# Patient Record
Sex: MALE | Race: BLACK OR AFRICAN AMERICAN | NOT HISPANIC OR LATINO | Employment: FULL TIME | URBAN - METROPOLITAN AREA
[De-identification: names, ages, dates, MRNs, and addresses within clinical notes are randomized per-mention and may not be internally consistent; named-entity substitution may affect disease eponyms.]

---

## 2023-11-29 ENCOUNTER — APPOINTMENT (OUTPATIENT)
Dept: GENERAL RADIOLOGY | Facility: HOSPITAL | Age: 43
End: 2023-11-29
Payer: COMMERCIAL

## 2023-11-29 ENCOUNTER — APPOINTMENT (OUTPATIENT)
Dept: CT IMAGING | Facility: HOSPITAL | Age: 43
End: 2023-11-29
Payer: COMMERCIAL

## 2023-11-29 ENCOUNTER — APPOINTMENT (OUTPATIENT)
Dept: MRI IMAGING | Facility: HOSPITAL | Age: 43
End: 2023-11-29
Payer: COMMERCIAL

## 2023-11-29 ENCOUNTER — HOSPITAL ENCOUNTER (OUTPATIENT)
Facility: HOSPITAL | Age: 43
Setting detail: OBSERVATION
Discharge: HOME | End: 2023-11-30
Attending: EMERGENCY MEDICINE | Admitting: INTERNAL MEDICINE
Payer: COMMERCIAL

## 2023-11-29 ENCOUNTER — APPOINTMENT (OUTPATIENT)
Dept: CARDIOLOGY | Facility: HOSPITAL | Age: 43
End: 2023-11-29
Payer: COMMERCIAL

## 2023-11-29 DIAGNOSIS — R20.2 ARM PARESTHESIA, LEFT: ICD-10-CM

## 2023-11-29 DIAGNOSIS — I63.9 CEREBROVASCULAR ACCIDENT (CVA), UNSPECIFIED MECHANISM: Primary | ICD-10-CM

## 2023-11-29 DIAGNOSIS — R41.841 COGNITIVE COMMUNICATION DEFICIT: ICD-10-CM

## 2023-11-29 PROBLEM — Z86.73 HISTORY OF STROKE: Status: ACTIVE | Noted: 2023-11-29

## 2023-11-29 PROBLEM — Z78.9 ALCOHOL USE: Status: ACTIVE | Noted: 2023-11-29

## 2023-11-29 PROBLEM — F14.90 COCAINE USE: Status: ACTIVE | Noted: 2023-11-29

## 2023-11-29 PROBLEM — R20.0 ARM NUMBNESS LEFT: Status: ACTIVE | Noted: 2023-11-29

## 2023-11-29 LAB
ALBUMIN SERPL-MCNC: 4 G/DL (ref 3.5–5.2)
ALBUMIN/GLOB SERPL: 1.2 G/DL
ALP SERPL-CCNC: 64 U/L (ref 39–117)
ALT SERPL W P-5'-P-CCNC: 13 U/L (ref 1–41)
AMPHET+METHAMPHET UR QL: NEGATIVE
AMPHETAMINES UR QL: NEGATIVE
ANION GAP SERPL CALCULATED.3IONS-SCNC: 8 MMOL/L (ref 5–15)
APTT PPP: 28.7 SECONDS (ref 22–39)
AST SERPL-CCNC: 27 U/L (ref 1–40)
BARBITURATES UR QL SCN: NEGATIVE
BASOPHILS # BLD MANUAL: 0.09 10*3/MM3 (ref 0–0.2)
BASOPHILS NFR BLD MANUAL: 1 % (ref 0–1.5)
BENZODIAZ UR QL SCN: NEGATIVE
BH CV ECHO MEAS - AO MAX PG: 5.8 MMHG
BH CV ECHO MEAS - AO MEAN PG: 3 MMHG
BH CV ECHO MEAS - AO ROOT DIAM: 3 CM
BH CV ECHO MEAS - AO V2 MAX: 120 CM/SEC
BH CV ECHO MEAS - AO V2 VTI: 20.1 CM
BH CV ECHO MEAS - AVA(I,D): 2.9 CM2
BH CV ECHO MEAS - EDV(CUBED): 227 ML
BH CV ECHO MEAS - EDV(MOD-SP2): 86.7 ML
BH CV ECHO MEAS - EDV(MOD-SP4): 86.3 ML
BH CV ECHO MEAS - EF(MOD-BP): 63.2 %
BH CV ECHO MEAS - EF(MOD-SP2): 63.3 %
BH CV ECHO MEAS - EF(MOD-SP4): 62 %
BH CV ECHO MEAS - ESV(CUBED): 68.9 ML
BH CV ECHO MEAS - ESV(MOD-SP2): 31.8 ML
BH CV ECHO MEAS - ESV(MOD-SP4): 32.8 ML
BH CV ECHO MEAS - FS: 32.8 %
BH CV ECHO MEAS - IVS/LVPW: 1.33 CM
BH CV ECHO MEAS - IVSD: 0.8 CM
BH CV ECHO MEAS - LA DIMENSION: 3.3 CM
BH CV ECHO MEAS - LAT PEAK E' VEL: 9.9 CM/SEC
BH CV ECHO MEAS - LV DIASTOLIC VOL/BSA (35-75): 44.7 CM2
BH CV ECHO MEAS - LV MASS(C)D: 162.8 GRAMS
BH CV ECHO MEAS - LV MAX PG: 3.9 MMHG
BH CV ECHO MEAS - LV MEAN PG: 2 MMHG
BH CV ECHO MEAS - LV SYSTOLIC VOL/BSA (12-30): 17 CM2
BH CV ECHO MEAS - LV V1 MAX: 99 CM/SEC
BH CV ECHO MEAS - LV V1 VTI: 18.5 CM
BH CV ECHO MEAS - LVIDD: 6.1 CM
BH CV ECHO MEAS - LVIDS: 4.1 CM
BH CV ECHO MEAS - LVOT AREA: 3.1 CM2
BH CV ECHO MEAS - LVOT DIAM: 2 CM
BH CV ECHO MEAS - LVPWD: 0.6 CM
BH CV ECHO MEAS - MED PEAK E' VEL: 10.6 CM/SEC
BH CV ECHO MEAS - MV A MAX VEL: 55 CM/SEC
BH CV ECHO MEAS - MV DEC SLOPE: 335 CM/SEC2
BH CV ECHO MEAS - MV DEC TIME: 0.24 SEC
BH CV ECHO MEAS - MV E MAX VEL: 70.2 CM/SEC
BH CV ECHO MEAS - MV E/A: 1.28
BH CV ECHO MEAS - MV MAX PG: 3.1 MMHG
BH CV ECHO MEAS - MV MEAN PG: 1 MMHG
BH CV ECHO MEAS - MV P1/2T: 77.1 MSEC
BH CV ECHO MEAS - MV V2 VTI: 22.7 CM
BH CV ECHO MEAS - MVA(P1/2T): 2.9 CM2
BH CV ECHO MEAS - MVA(VTI): 2.6 CM2
BH CV ECHO MEAS - PA ACC TIME: 0.16 SEC
BH CV ECHO MEAS - SI(MOD-SP2): 28.4 ML/M2
BH CV ECHO MEAS - SI(MOD-SP4): 27.7 ML/M2
BH CV ECHO MEAS - SV(LVOT): 58.1 ML
BH CV ECHO MEAS - SV(MOD-SP2): 54.9 ML
BH CV ECHO MEAS - SV(MOD-SP4): 53.5 ML
BH CV ECHO MEAS - TAPSE (>1.6): 1.78 CM
BH CV ECHO MEASUREMENTS AVERAGE E/E' RATIO: 6.85
BH CV ECHO SHUNT ASSESSMENT PERFORMED (HIDDEN SCRIPTING): 1
BH CV VAS BP LEFT ARM: NORMAL MMHG
BH CV XLRA - RV BASE: 3.3 CM
BH CV XLRA - RV LENGTH: 7.2 CM
BH CV XLRA - RV MID: 2.3 CM
BH CV XLRA - TDI S': 10.9 CM/SEC
BILIRUB SERPL-MCNC: 0.3 MG/DL (ref 0–1.2)
BUN SERPL-MCNC: 7 MG/DL (ref 6–20)
BUN/CREAT SERPL: 9.5 (ref 7–25)
BUPRENORPHINE SERPL-MCNC: NEGATIVE NG/ML
CALCIUM SPEC-SCNC: 8.6 MG/DL (ref 8.6–10.5)
CANNABINOIDS SERPL QL: NEGATIVE
CHLORIDE SERPL-SCNC: 100 MMOL/L (ref 98–107)
CO2 SERPL-SCNC: 28 MMOL/L (ref 22–29)
COCAINE UR QL: POSITIVE
CREAT SERPL-MCNC: 0.74 MG/DL (ref 0.76–1.27)
DEPRECATED RDW RBC AUTO: 45.7 FL (ref 37–54)
EGFRCR SERPLBLD CKD-EPI 2021: 116.7 ML/MIN/1.73
EOSINOPHIL # BLD MANUAL: 3.48 10*3/MM3 (ref 0–0.4)
EOSINOPHIL NFR BLD MANUAL: 37 % (ref 0.3–6.2)
ERYTHROCYTE [DISTWIDTH] IN BLOOD BY AUTOMATED COUNT: 13.3 % (ref 12.3–15.4)
ETHANOL BLD-MCNC: <10 MG/DL (ref 0–10)
FENTANYL UR-MCNC: NEGATIVE NG/ML
GLOBULIN UR ELPH-MCNC: 3.4 GM/DL
GLUCOSE BLDC GLUCOMTR-MCNC: 130 MG/DL (ref 70–130)
GLUCOSE BLDC GLUCOMTR-MCNC: 75 MG/DL (ref 70–130)
GLUCOSE SERPL-MCNC: 100 MG/DL (ref 65–99)
HCT VFR BLD AUTO: 42 % (ref 37.5–51)
HGB BLD-MCNC: 14.2 G/DL (ref 13–17.7)
HOLD SPECIMEN: NORMAL
IVRT: 88 MS
LEFT ATRIUM VOLUME INDEX: 23.2 ML/M2
LEFT ATRIUM VOLUME: 44.8 ML
LYMPHOCYTES # BLD MANUAL: 2.73 10*3/MM3 (ref 0.7–3.1)
LYMPHOCYTES NFR BLD MANUAL: 2 % (ref 5–12)
MCH RBC QN AUTO: 31.8 PG (ref 26.6–33)
MCHC RBC AUTO-ENTMCNC: 33.8 G/DL (ref 31.5–35.7)
MCV RBC AUTO: 94.2 FL (ref 79–97)
METHADONE UR QL SCN: NEGATIVE
MONOCYTES # BLD: 0.19 10*3/MM3 (ref 0.1–0.9)
NEUTROPHILS # BLD AUTO: 2.92 10*3/MM3 (ref 1.7–7)
NEUTROPHILS NFR BLD MANUAL: 30 % (ref 42.7–76)
NEUTS BAND NFR BLD MANUAL: 1 % (ref 0–5)
NRBC SPEC MANUAL: 0 /100 WBC (ref 0–0.2)
OPIATES UR QL: NEGATIVE
OXYCODONE UR QL SCN: NEGATIVE
PCP UR QL SCN: NEGATIVE
PLAT MORPH BLD: NORMAL
PLATELET # BLD AUTO: 387 10*3/MM3 (ref 140–450)
PMV BLD AUTO: 9.1 FL (ref 6–12)
POTASSIUM SERPL-SCNC: 3.4 MMOL/L (ref 3.5–5.2)
POTASSIUM SERPL-SCNC: 3.7 MMOL/L (ref 3.5–5.2)
PROT SERPL-MCNC: 7.4 G/DL (ref 6–8.5)
QT INTERVAL: 346 MS
QTC INTERVAL: 454 MS
RBC # BLD AUTO: 4.46 10*6/MM3 (ref 4.14–5.8)
RBC MORPH BLD: NORMAL
SODIUM SERPL-SCNC: 136 MMOL/L (ref 136–145)
TRICYCLICS UR QL SCN: NEGATIVE
TROPONIN T SERPL HS-MCNC: 8 NG/L
VARIANT LYMPHS NFR BLD MANUAL: 2 % (ref 0–5)
VARIANT LYMPHS NFR BLD MANUAL: 27 % (ref 19.6–45.3)
WBC MORPH BLD: NORMAL
WBC NRBC COR # BLD AUTO: 9.41 10*3/MM3 (ref 3.4–10.8)
WHOLE BLOOD HOLD COAG: NORMAL
WHOLE BLOOD HOLD SPECIMEN: NORMAL

## 2023-11-29 PROCEDURE — 80307 DRUG TEST PRSMV CHEM ANLYZR: CPT | Performed by: EMERGENCY MEDICINE

## 2023-11-29 PROCEDURE — 25510000001 IOPAMIDOL PER 1 ML: Performed by: EMERGENCY MEDICINE

## 2023-11-29 PROCEDURE — 0042T HC CT CEREBRAL PERFUSION W/WO CONTRAST: CPT

## 2023-11-29 PROCEDURE — 85025 COMPLETE CBC W/AUTO DIFF WBC: CPT | Performed by: EMERGENCY MEDICINE

## 2023-11-29 PROCEDURE — 96361 HYDRATE IV INFUSION ADD-ON: CPT

## 2023-11-29 PROCEDURE — 82948 REAGENT STRIP/BLOOD GLUCOSE: CPT

## 2023-11-29 PROCEDURE — 96365 THER/PROPH/DIAG IV INF INIT: CPT

## 2023-11-29 PROCEDURE — 85007 BL SMEAR W/DIFF WBC COUNT: CPT | Performed by: EMERGENCY MEDICINE

## 2023-11-29 PROCEDURE — 25810000003 SODIUM CHLORIDE 0.9 % SOLUTION: Performed by: FAMILY MEDICINE

## 2023-11-29 PROCEDURE — G0378 HOSPITAL OBSERVATION PER HR: HCPCS

## 2023-11-29 PROCEDURE — 70551 MRI BRAIN STEM W/O DYE: CPT

## 2023-11-29 PROCEDURE — 71045 X-RAY EXAM CHEST 1 VIEW: CPT

## 2023-11-29 PROCEDURE — 82077 ASSAY SPEC XCP UR&BREATH IA: CPT | Performed by: EMERGENCY MEDICINE

## 2023-11-29 PROCEDURE — 93306 TTE W/DOPPLER COMPLETE: CPT

## 2023-11-29 PROCEDURE — 70496 CT ANGIOGRAPHY HEAD: CPT

## 2023-11-29 PROCEDURE — 36415 COLL VENOUS BLD VENIPUNCTURE: CPT

## 2023-11-29 PROCEDURE — 93306 TTE W/DOPPLER COMPLETE: CPT | Performed by: INTERNAL MEDICINE

## 2023-11-29 PROCEDURE — 85730 THROMBOPLASTIN TIME PARTIAL: CPT | Performed by: EMERGENCY MEDICINE

## 2023-11-29 PROCEDURE — 93005 ELECTROCARDIOGRAM TRACING: CPT | Performed by: EMERGENCY MEDICINE

## 2023-11-29 PROCEDURE — 25010000002 THIAMINE PER 100 MG: Performed by: FAMILY MEDICINE

## 2023-11-29 PROCEDURE — 25010000002 MAGNESIUM SULFATE IN D5W 1G/100ML (PREMIX) 1-5 GM/100ML-% SOLUTION: Performed by: NURSE PRACTITIONER

## 2023-11-29 PROCEDURE — 84132 ASSAY OF SERUM POTASSIUM: CPT | Performed by: EMERGENCY MEDICINE

## 2023-11-29 PROCEDURE — 99204 OFFICE O/P NEW MOD 45 MIN: CPT | Performed by: NURSE PRACTITIONER

## 2023-11-29 PROCEDURE — 96375 TX/PRO/DX INJ NEW DRUG ADDON: CPT

## 2023-11-29 PROCEDURE — 93010 ELECTROCARDIOGRAM REPORT: CPT | Performed by: INTERNAL MEDICINE

## 2023-11-29 PROCEDURE — 70498 CT ANGIOGRAPHY NECK: CPT

## 2023-11-29 PROCEDURE — 99285 EMERGENCY DEPT VISIT HI MDM: CPT

## 2023-11-29 PROCEDURE — 70450 CT HEAD/BRAIN W/O DYE: CPT

## 2023-11-29 PROCEDURE — 84484 ASSAY OF TROPONIN QUANT: CPT | Performed by: EMERGENCY MEDICINE

## 2023-11-29 PROCEDURE — 99222 1ST HOSP IP/OBS MODERATE 55: CPT | Performed by: FAMILY MEDICINE

## 2023-11-29 PROCEDURE — 80053 COMPREHEN METABOLIC PANEL: CPT | Performed by: EMERGENCY MEDICINE

## 2023-11-29 RX ORDER — MIDAZOLAM HYDROCHLORIDE 1 MG/ML
2 INJECTION INTRAMUSCULAR; INTRAVENOUS
Status: DISPENSED | OUTPATIENT
Start: 2023-11-29 | End: 2023-11-30

## 2023-11-29 RX ORDER — CLOPIDOGREL BISULFATE 75 MG/1
300 TABLET ORAL ONCE
Status: COMPLETED | OUTPATIENT
Start: 2023-11-29 | End: 2023-11-29

## 2023-11-29 RX ORDER — MIDAZOLAM HYDROCHLORIDE 1 MG/ML
1 INJECTION INTRAMUSCULAR; INTRAVENOUS
Status: DISPENSED | OUTPATIENT
Start: 2023-11-29 | End: 2023-11-30

## 2023-11-29 RX ORDER — SODIUM CHLORIDE 0.9 % (FLUSH) 0.9 %
10 SYRINGE (ML) INJECTION EVERY 12 HOURS SCHEDULED
Status: DISCONTINUED | OUTPATIENT
Start: 2023-11-29 | End: 2023-11-30 | Stop reason: HOSPADM

## 2023-11-29 RX ORDER — SODIUM CHLORIDE 0.9 % (FLUSH) 0.9 %
10 SYRINGE (ML) INJECTION AS NEEDED
Status: DISCONTINUED | OUTPATIENT
Start: 2023-11-29 | End: 2023-11-30 | Stop reason: HOSPADM

## 2023-11-29 RX ORDER — ASPIRIN 81 MG/1
81 TABLET, CHEWABLE ORAL DAILY
Status: DISCONTINUED | OUTPATIENT
Start: 2023-11-30 | End: 2023-11-30 | Stop reason: HOSPADM

## 2023-11-29 RX ORDER — FOLIC ACID 1 MG/1
1 TABLET ORAL DAILY
Status: DISCONTINUED | OUTPATIENT
Start: 2023-11-29 | End: 2023-11-30 | Stop reason: HOSPADM

## 2023-11-29 RX ORDER — AMLODIPINE BESYLATE 10 MG/1
10 TABLET ORAL DAILY
COMMUNITY

## 2023-11-29 RX ORDER — SODIUM CHLORIDE 9 MG/ML
100 INJECTION, SOLUTION INTRAVENOUS CONTINUOUS
Status: ACTIVE | OUTPATIENT
Start: 2023-11-29 | End: 2023-11-30

## 2023-11-29 RX ORDER — POLYETHYLENE GLYCOL 3350 17 G/17G
17 POWDER, FOR SOLUTION ORAL DAILY PRN
Status: DISCONTINUED | OUTPATIENT
Start: 2023-11-29 | End: 2023-11-30 | Stop reason: HOSPADM

## 2023-11-29 RX ORDER — BISACODYL 5 MG/1
5 TABLET, DELAYED RELEASE ORAL DAILY PRN
Status: DISCONTINUED | OUTPATIENT
Start: 2023-11-29 | End: 2023-11-30 | Stop reason: HOSPADM

## 2023-11-29 RX ORDER — ASPIRIN 300 MG/1
300 SUPPOSITORY RECTAL ONCE
Status: DISCONTINUED | OUTPATIENT
Start: 2023-11-29 | End: 2023-11-30

## 2023-11-29 RX ORDER — SODIUM CHLORIDE 9 MG/ML
40 INJECTION, SOLUTION INTRAVENOUS AS NEEDED
Status: DISCONTINUED | OUTPATIENT
Start: 2023-11-29 | End: 2023-11-30 | Stop reason: HOSPADM

## 2023-11-29 RX ORDER — ACETAMINOPHEN 325 MG/1
650 TABLET ORAL EVERY 4 HOURS PRN
Status: DISCONTINUED | OUTPATIENT
Start: 2023-11-29 | End: 2023-11-30 | Stop reason: HOSPADM

## 2023-11-29 RX ORDER — LORAZEPAM 1 MG/1
1 TABLET ORAL
Status: DISPENSED | OUTPATIENT
Start: 2023-11-29 | End: 2023-11-30

## 2023-11-29 RX ORDER — LORAZEPAM 1 MG/1
2 TABLET ORAL
Status: DISPENSED | OUTPATIENT
Start: 2023-11-29 | End: 2023-11-30

## 2023-11-29 RX ORDER — ACETAMINOPHEN 650 MG/1
650 SUPPOSITORY RECTAL EVERY 4 HOURS PRN
Status: DISCONTINUED | OUTPATIENT
Start: 2023-11-29 | End: 2023-11-30 | Stop reason: HOSPADM

## 2023-11-29 RX ORDER — MIDAZOLAM HYDROCHLORIDE 1 MG/ML
4 INJECTION INTRAMUSCULAR; INTRAVENOUS
Status: DISPENSED | OUTPATIENT
Start: 2023-11-29 | End: 2023-11-30

## 2023-11-29 RX ORDER — MULTIPLE VITAMINS W/ MINERALS TAB 9MG-400MCG
1 TAB ORAL DAILY
Status: DISCONTINUED | OUTPATIENT
Start: 2023-11-29 | End: 2023-11-30 | Stop reason: HOSPADM

## 2023-11-29 RX ORDER — ASPIRIN 325 MG
325 TABLET ORAL ONCE
Status: DISCONTINUED | OUTPATIENT
Start: 2023-11-29 | End: 2023-11-30

## 2023-11-29 RX ORDER — ONDANSETRON 4 MG/1
4 TABLET, FILM COATED ORAL EVERY 6 HOURS PRN
Status: DISCONTINUED | OUTPATIENT
Start: 2023-11-29 | End: 2023-11-30 | Stop reason: HOSPADM

## 2023-11-29 RX ORDER — AMOXICILLIN 250 MG
2 CAPSULE ORAL 2 TIMES DAILY
Status: DISCONTINUED | OUTPATIENT
Start: 2023-11-29 | End: 2023-11-30 | Stop reason: HOSPADM

## 2023-11-29 RX ORDER — BISACODYL 10 MG
10 SUPPOSITORY, RECTAL RECTAL DAILY PRN
Status: DISCONTINUED | OUTPATIENT
Start: 2023-11-29 | End: 2023-11-30 | Stop reason: HOSPADM

## 2023-11-29 RX ORDER — ONDANSETRON 2 MG/ML
4 INJECTION INTRAMUSCULAR; INTRAVENOUS EVERY 6 HOURS PRN
Status: DISCONTINUED | OUTPATIENT
Start: 2023-11-29 | End: 2023-11-30 | Stop reason: HOSPADM

## 2023-11-29 RX ORDER — ATORVASTATIN CALCIUM 40 MG/1
80 TABLET, FILM COATED ORAL NIGHTLY
Status: DISCONTINUED | OUTPATIENT
Start: 2023-11-29 | End: 2023-11-30 | Stop reason: HOSPADM

## 2023-11-29 RX ORDER — MAGNESIUM SULFATE 1 G/100ML
1 INJECTION INTRAVENOUS ONCE
Status: COMPLETED | OUTPATIENT
Start: 2023-11-29 | End: 2023-11-29

## 2023-11-29 RX ORDER — ASPIRIN 300 MG/1
300 SUPPOSITORY RECTAL DAILY
Status: DISCONTINUED | OUTPATIENT
Start: 2023-11-29 | End: 2023-11-29

## 2023-11-29 RX ORDER — POTASSIUM CHLORIDE 20 MEQ/1
40 TABLET, EXTENDED RELEASE ORAL EVERY 4 HOURS
Status: DISPENSED | OUTPATIENT
Start: 2023-11-29 | End: 2023-11-29

## 2023-11-29 RX ORDER — ASPIRIN 325 MG
325 TABLET ORAL DAILY
Status: DISCONTINUED | OUTPATIENT
Start: 2023-11-29 | End: 2023-11-29

## 2023-11-29 RX ORDER — CLOPIDOGREL BISULFATE 75 MG/1
75 TABLET ORAL DAILY
Status: DISCONTINUED | OUTPATIENT
Start: 2023-11-30 | End: 2023-11-29

## 2023-11-29 RX ORDER — THIAMINE HYDROCHLORIDE 100 MG/ML
200 INJECTION, SOLUTION INTRAMUSCULAR; INTRAVENOUS EVERY 8 HOURS SCHEDULED
Status: DISCONTINUED | OUTPATIENT
Start: 2023-11-29 | End: 2023-11-30 | Stop reason: HOSPADM

## 2023-11-29 RX ORDER — ACETAMINOPHEN 160 MG/5ML
650 SOLUTION ORAL EVERY 4 HOURS PRN
Status: DISCONTINUED | OUTPATIENT
Start: 2023-11-29 | End: 2023-11-30 | Stop reason: HOSPADM

## 2023-11-29 RX ADMIN — Medication 1 TABLET: at 18:34

## 2023-11-29 RX ADMIN — STANDARDIZED SENNA CONCENTRATE AND DOCUSATE SODIUM 2 TABLET: 8.6; 5 TABLET, FILM COATED ORAL at 21:57

## 2023-11-29 RX ADMIN — MAGNESIUM SULFATE HEPTAHYDRATE 1 G: 1 INJECTION, SOLUTION INTRAVENOUS at 10:07

## 2023-11-29 RX ADMIN — SODIUM CHLORIDE 100 ML/HR: 9 INJECTION, SOLUTION INTRAVENOUS at 16:19

## 2023-11-29 RX ADMIN — CLOPIDOGREL BISULFATE 300 MG: 75 TABLET ORAL at 09:41

## 2023-11-29 RX ADMIN — ATORVASTATIN CALCIUM 80 MG: 40 TABLET, FILM COATED ORAL at 21:57

## 2023-11-29 RX ADMIN — ASPIRIN 325 MG: 325 TABLET ORAL at 09:03

## 2023-11-29 RX ADMIN — POTASSIUM CHLORIDE 40 MEQ: 1500 TABLET, EXTENDED RELEASE ORAL at 14:36

## 2023-11-29 RX ADMIN — FOLIC ACID 1 MG: 1 TABLET ORAL at 18:34

## 2023-11-29 RX ADMIN — THIAMINE HYDROCHLORIDE 200 MG: 100 INJECTION, SOLUTION INTRAMUSCULAR; INTRAVENOUS at 21:56

## 2023-11-29 RX ADMIN — IOPAMIDOL 115 ML: 755 INJECTION, SOLUTION INTRAVENOUS at 08:45

## 2023-11-29 NOTE — CASE MANAGEMENT/SOCIAL WORK
Discharge Planning Assessment  [facility]     Patient Name: Cornelio Up  MRN: [id number]  Today's Date: 11/29/2023    Admit Date: 11/29/2023    Plan: IDP   Discharge Needs Assessment       Row Name 11/29/23 1207       Living Environment    People in Home other (see comments)  Pt.'s father reports pt. has roommates    Current Living Arrangements apartment    Potentially Unsafe Housing Conditions unable to assess    In the past 12 months has the electric, gas, oil, or water company threatened to shut off services in your home? No    Primary Care Provided by self    Provides Primary Care For no one    Family Caregiver if Needed parent(s)    Family Caregiver Names Claude White    Quality of Family Relationships helpful;involved;supportive    Able to Return to Prior Arrangements yes       Food Insecurity    Within the past 12 months, you worried that your food would run out before you got the money to buy more. Never true    Within the past 12 months, the food you bought just didn't last and you didn't have money to get more. Never true       Transition Planning    Patient/Family Anticipates Transition to home    Transportation Anticipated other (see comments)  will need assistance with transportation       Discharge Needs Assessment    Readmission Within the Last 30 Days no previous admission in last 30 days    Equipment Currently Used at Home none                   Discharge Plan       Row Name 11/29/23 1222       Plan    Plan IDP    Plan Comments MSW met with pt. and pt.'s father Claude White at bedside. Pt. was asleep during visit. Pt.'s father reports that pt. lives with some roommates in [facility] [place]. Pt.'s PCP isn't listed but pt.'s father reports he believes he has a PCP but doesn't know the name. Pt.'s pharmacy is listed as [facility]. Pt.'s insurance is [employer]. Pt. is independent at baseline. Pt. works fulltime at [employer]. Pt.'s father denies DME, O2, and HH at this time. Pt.'s father  reports that pt. will need assistance with transportation when medically ready to d/c. Pt. doesn't have an advanced directive or ACP documentation on file. CM will continue to follow pt. throughout his stay.    Final Discharge Disposition Code 30 - still a patient                  Continued Care and Services - Admitted Since 11/29/2023    Coordination has not been started for this encounter.       Expected Discharge Date and Time       Expected Discharge Date Expected Discharge Time    Nov 30, 2023            Demographic Summary       Row Name 11/29/23 1204       General Information    Admission Type observation    Arrived From other (see comments)  Work--[employer]    Referral Source admission list;emergency department    Reason for Consult discharge planning    Preferred Language English                   Functional Status       Row Name 11/29/23 1206       Physical Activity    On average, how many days per week do you engage in moderate to strenuous exercise (like a brisk walk)? Patient refu    On average, how many minutes do you engage in exercise at this level? Patient refu       Functional Status, IADL    Medications independent    Meal Preparation independent    Housekeeping independent    Laundry independent    Shopping independent       Mental Status Summary    Recent Changes in Mental Status/Cognitive Functioning unable to assess       Employment/    Employment Status employed full-time                   Psychosocial    No documentation.                  Abuse/Neglect    No documentation.                  Legal    No documentation.                  Substance Abuse    No documentation.                  Patient Forms    No documentation.                     KARLA Jesus

## 2023-11-29 NOTE — CONSULTS
Stroke Consult Note    Patient Name: Cornelio Up   MRN: [id number]  Age: 43 y.o.  Sex: male  : 1980    Primary Care Physician: Provider, No Known  Referring Physician:  Dr. Del Angel    TIME STROKE TEAM CALLED: 0815 EST     TIME PATIENT SEEN: 0830 EST    Handedness: Right  Race:      Chief Complaint/Reason for Consultation: Left sided weakness and numbness, nausea    Subjective .  HPI: Cornelio Up is a 43-year-old, -American, right-handed male with known diagnosis of prior CVA [, and 2023 (received care at [facility]); residual mild left leg weakness and numbness], HTN, tobacco abuse, ETOH abuse, and polysubstance abuse who presents with worsening left-sided weakness numbness, dysarthria, and nausea.  Patient states that he initially became mildly nauseous last night around 1800; shortly thereafter he developed worsening weakness and numbness from his baseline involving his left leg as well as left arm weakness and numbness which is new.  He stated he then stayed up to watch the [employer] game and had a few drinks (tell me he had a couple of beers and a bourbon).  When he awoke this morning around 0430 he noted that his symptoms seemed worse.  He did use cocaine this morning, which he says is a occasional habit for him.  He called 911 as his left-sided weakness and numbness continue to worsen.    On arrival to [facility] patient is alert and oriented x3.  His speech is mildly dysarthric, no aphasia.  Gaze is normal, all visual fields are intact.  He has a subtle left facial droop.  Initially it appeared like he might have some right tongue deviation but patient was actually able to correct this to midline.  No drift in the bilateral upper extremities though his left arm is weaker than his right (4+/5).  He has mild drift in his left leg.  Patient endorses decreased sensation in his left face, upper and lower extremities.  He is on aspirin 81 mg at baseline as well as amlodipine; states he is  not as consistent with his medications.    Last Known Normal Date/Time: 11/28/2023 @1800 EST     Review of Systems   Constitutional:  Negative for chills and fever.   HENT:  Negative for trouble swallowing.    Eyes:  Positive for visual disturbance.        Blurred vision   Respiratory:  Positive for cough. Negative for shortness of breath.         Chronic cough x 6 months   Cardiovascular:  Negative for palpitations.        Reports tightness between shoulder blades   Gastrointestinal:  Positive for nausea. Negative for vomiting.   Musculoskeletal:  Positive for gait problem.   Skin: Negative.    Neurological:  Positive for facial asymmetry, weakness and numbness. Negative for speech difficulty and headaches.   Psychiatric/Behavioral: Negative.        Past Medical History:   Diagnosis Date    Hypertension     Stroke      History reviewed. No pertinent surgical history.  History reviewed. No pertinent family history.  Social History     Socioeconomic History    Marital status: Single   Substance and Sexual Activity    Drug use: Yes     Types: Cocaine(coke)     No Known Allergies  Prior to Admission medications    Not on File             Objective     Temp:  [98.6 °F (37 °C)] 98.6 °F (37 °C)  Heart Rate:  [] 77  Resp:  [16] 16  BP: (136-180)/() 136/101  Neurological Exam  Mental Status  Alert. Oriented to person, place, and time. Mild dysarthria present. Language is fluent with no aphasia. Attention and concentration are normal.    Cranial Nerves  CN II: Visual fields full to confrontation.  CN III, IV, VI: Extraocular movements intact bilaterally. Normal lids and orbits bilaterally. Pupils equal round and reactive to light bilaterally.  CN V:  Right: Facial sensation is normal.  Left: Diminished sensation of the entire left side of the face.  CN VII:  Right: There is no facial weakness.  Left: There is central facial weakness.  CN XI: Shoulder shrug strength is normal.  CN XII: Tongue deviates to the  right.    Motor  Normal muscle bulk throughout. No fasciculations present. Normal muscle tone. No abnormal involuntary movements. Strength is 5/5 in all four extremities except as noted.  LUE 4+/5, LLE 4/5.    Sensory  Light touch abnormality:   Diminished sensation in the left face, arm and leg.    Coordination    No dysmetria out of proportion to weakness.    Gait    Not tested.      Physical Exam  Vitals reviewed.   Constitutional:       Appearance: Normal appearance.   HENT:      Head: Normocephalic and atraumatic.   Eyes:      General: Lids are normal.      Extraocular Movements: Extraocular movements intact.      Pupils: Pupils are equal, round, and reactive to light.   Cardiovascular:      Rate and Rhythm: Tachycardia present.   Pulmonary:      Effort: Pulmonary effort is normal. No respiratory distress.   Musculoskeletal:         General: No swelling. Normal range of motion.      Cervical back: Normal range of motion and neck supple.   Skin:     General: Skin is warm and dry.   Neurological:      Mental Status: He is alert and oriented to person, place, and time.      Cranial Nerves: Cranial nerve deficit and dysarthria present.      Sensory: Sensory deficit present.      Motor: Weakness present.   Psychiatric:         Mood and Affect: Mood normal.         Behavior: Behavior normal.         Acute Stroke Data    IV Thrombolytic (TPA/Tenecteplase) Inclusion / Exclusion Criteria    Time: 12:02 EST  Person Administering Scale: ANGELINA Gordon    Inclusion Criteria  [x]   18 years of age or greater   []   Onset of symptoms < 4.5 hours before beginning treatment (stroke onset = time patient was last seen well or without symptoms).   []   Diagnosis of acute ischemic stroke causing measurable disabling deficit (Complete Hemianopia, Any Aphasia, Visual or Sensory Extinction, Any weakness limiting sustained effort against gravity)   []   Any remaining deficit considered potentially disabling in view of patient  and practitioner   Exclusion criteria (Do not proceed with Alteplase if any are checked under exclusion criteria)  [x]   Onset unknown or GREATER than 4.5 hours   []   ICH on CT/MRI   []   CT demonstrates hypodensity representing acute or subacute infarct   []   Significant head trauma or prior stroke in the previous 3 months   []   Symptoms suggestive of subarachnoid hemorrhage   []   History of un-ruptured intracranial aneurysm GREATER than 10 mm   []   Recent intracranial or intraspinal surgery within the last 3 months   []   Arterial puncture at a non-compressible site in the previous 7 days   []   Active internal bleeding   []   Acute bleeding tendency   []   Platelet count LESS than 100,000 for known hematological diseases such as leukemia, thrombocytopenia or chronic cirrhosis   []   Current use of anticoagulant with INR GREATER than 1.7 or PT GREATER than 15 seconds, aPTT GREATER than 40 seconds   []   Heparin received within 48 hours, resulting in abnormally elevated aPTT GREATER than upper limit of normal   []   Current use of direct thrombin inhibitors or direct factor Xa inhibitors in the past 48 hours   []   Elevated blood pressure refractory to treatment (systolic GREATER than 185 mm/Hg or diastolic  GREATER than 110 mm/Hg   []   Suspected infective endocarditis and aortic arch dissection   []   Current use of therapeutic treatment dose of low-molecular-weight heparin (LMWH) within the previous 24 hours   []   Structural GI malignancy or bleed   Relative exclusion for all patients  []   Only minor nondisabling symptoms   []   Pregnancy   []   Seizure at onset with postictal residual neurological impairments   []   Major surgery or previous trauma within past 14 days   []   History of previous spontaneous ICH, intracranial neoplasm, or AV malformation   []   Postpartum (within previous 14 days)   []   Recent GI or urinary tract hemorrhage (within previous 21 days)   []   Recent acute MI (within previous  3 months)   []   History of unruptured intracranial aneurysm LESS than 10 mm   []   History of ruptured intracranial aneurysm   []   Blood glucose LESS than 50 mg/dL (2.7 mmol/L)   []   Dural puncture within the last 7 days   []   Known GREATER than 10 cerebral microbleeds   Additional exclusions for patients with symptoms onset between 3 and 4.5 hours.  []   Age > 80.   []   On any anticoagulants regardless of INR  >>> Warfarin (Coumadin), Heparin, Enoxaparin (Lovenox), fondaparinux (Arixtra), bivalirudin (Angiomax), Argatroban, dabigatran (Pradaxa), rivaroxaban (Xarelto), or apixaban (Eliquis)   []   Severe stroke (NIHSS > 25).   []   History of BOTH diabetes and previous ischemic stroke.   []   The risks and benefits have been discussed with the patient or family related to the administration of IV alteplase for stroke symptoms.   []   I have discussed and reviewed the patient's case and imaging with the attending prior to IV Thrombolytic (TPA/Tenecteplase).    Time Thrombolytic administered       Hospital Meds:  Scheduled- [START ON 2023] aspirin, 81 mg, Oral, Daily  aspirin, 325 mg, Oral, Once   Or  aspirin, 300 mg, Rectal, Once      Infusions-     PRNs-   sodium chloride    Functional Status Prior to Current Stroke/Denia Score: MRS 0    NIH Stroke Scale  Time: 12:02 EST  Person Administering Scale: ANGELINA Gordon    1a  Level of consciousness: 0=alert; keenly responsive   1b. LOC questions:  0=Answers both questions correctly   1c. LOC commands: 0=Performs both tasks correctly   2.  Best Gaze: 0=normal   3.  Visual: 0=No visual loss   4. Facial Palsy: 1=Minor paralysis (flattened nasolabial fold, asymmetric on smiling)   5a.  Motor left arm: 0=No drift, limb holds 90 (or 45) degrees for full 10 seconds   5b.  Motor right arm: 0=No drift, limb holds 90 (or 45) degrees for full 10 seconds   6a. motor left le=Drift, limb holds 90 (or 45) degrees but drifts down before full 10 seconds: does not  hit bed   6b  Motor right le=No drift, limb holds 90 (or 45) degrees for full 10 seconds   7. Limb Ataxia: 0=Absent   8.  Sensory: 1=Mild to moderate sensory loss; patient feels pinprick is less sharp or is dull on the affected side; there is a loss of superficial pain with pinprick but patient is aware He is being touched   9. Best Language:  0=No aphasia, normal   10. Dysarthria: 1=Mild to moderate, patient slurs at least some words and at worst, can be understood with some difficulty   11. Extinction and Inattention: 0=No abnormality    Total:   4       Results Reviewed:  I have personally reviewed current lab, radiology, and data and agree with results.  WBC   Date Value Ref Range Status   2023 9.41 3.40 - 10.80 10*3/mm3 Final     RBC   Date Value Ref Range Status   2023 4.46 4.14 - 5.80 10*6/mm3 Final     Hemoglobin   Date Value Ref Range Status   2023 14.2 13.0 - 17.7 g/dL Final     Hematocrit   Date Value Ref Range Status   2023 42.0 37.5 - 51.0 % Final     MCV   Date Value Ref Range Status   2023 94.2 79.0 - 97.0 fL Final     MCH   Date Value Ref Range Status   2023 31.8 26.6 - 33.0 pg Final     MCHC   Date Value Ref Range Status   2023 33.8 31.5 - 35.7 g/dL Final     RDW   Date Value Ref Range Status   2023 13.3 12.3 - 15.4 % Final     RDW-SD   Date Value Ref Range Status   2023 45.7 37.0 - 54.0 fl Final     MPV   Date Value Ref Range Status   2023 9.1 6.0 - 12.0 fL Final     Platelets   Date Value Ref Range Status   2023 387 140 - 450 10*3/mm3 Final     Neutrophils Absolute   Date Value Ref Range Status   2023 2.92 1.70 - 7.00 10*3/mm3 Final     Eosinophils Absolute   Date Value Ref Range Status   2023 3.48 (H) 0.00 - 0.40 10*3/mm3 Final     Basophils Absolute   Date Value Ref Range Status   2023 0.09 0.00 - 0.20 10*3/mm3 Final     nRBC   Date Value Ref Range Status   2023 0.0 0.0 - 0.2 /100 WBC Final     Lab  Results   Component Value Date    GLUCOSE 100 (H) 11/29/2023    BUN 7 11/29/2023    CREATININE 0.74 (L) 11/29/2023    EGFR 116.7 11/29/2023    BCR 9.5 11/29/2023    K 3.4 (L) 11/29/2023    CO2 28.0 11/29/2023    CALCIUM 8.6 11/29/2023    ALBUMIN 4.0 11/29/2023    BILITOT 0.3 11/29/2023    AST 27 11/29/2023    ALT 13 11/29/2023     UDS positive for cocaine  Ethanol <10    CT Angiogram Head w AI Analysis of LVO    Result Date: 11/29/2023  Impression: No calculated core infarct or ischemic tissue at risk on CT perfusion. Diffuse diminutive caliber of the intracranial arteries which could reflect diffuse vasospasm. There is indistinct moderate or severe short segment stenosis at the origin of the inferior division of the right M2. There is suggestion of asymmetric decrease in the number and conspicuity of distal right M2 branches compared to the left side, particularly on the MIP images. Paranasal sinus disease. Electronically Signed: Brayden Burdick MD  11/29/2023 9:43 AM EST  Workstation ID: [id number]    CT Angiogram Neck    Result Date: 11/29/2023  Impression: No calculated core infarct or ischemic tissue at risk on CT perfusion. Diffuse diminutive caliber of the intracranial arteries which could reflect diffuse vasospasm. There is indistinct moderate or severe short segment stenosis at the origin of the inferior division of the right M2. There is suggestion of asymmetric decrease in the number and conspicuity of distal right M2 branches compared to the left side, particularly on the MIP images. Paranasal sinus disease. Electronically Signed: Brayden Burdick MD  11/29/2023 9:43 AM EST  Workstation ID: [id number]    CT CEREBRAL PERFUSION WITH & WITHOUT CONTRAST    Result Date: 11/29/2023  Impression: No calculated core infarct or ischemic tissue at risk on CT perfusion. Diffuse diminutive caliber of the intracranial arteries which could reflect diffuse vasospasm. There is indistinct moderate or severe short segment  stenosis at the origin of the inferior division of the right M2. There is suggestion of asymmetric decrease in the number and conspicuity of distal right M2 branches compared to the left side, particularly on the MIP images. Paranasal sinus disease. Electronically Signed: Brayden Burdick MD  11/29/2023 9:43 AM EST  Workstation ID: [id number]    CT Head Without Contrast Stroke Protocol    Result Date: 11/29/2023  Impression: No evidence of acute intracranial disease. Electronically Signed: Vincent Moya MD  11/29/2023 8:50 AM EST  Workstation ID: [id number]          Assessment/Plan:  43-year-old, -American, right-handed male with known diagnosis of prior CVA [2013, and April 2023 (received care at [facility]); residual mild left leg weakness and numbness], HTN, tobacco abuse, ETOH abuse, and polysubstance abuse who presents with worsening left-sided weakness numbness, left facial droop, dysarthria, and nausea.  Symptoms started at 1800 last night.  Initial NIHSS 4.  Patient reports using cocaine earlier this morning.  Initial BP is 180/111.  CT head is negative for acute abnormality.  Not a candidate for thrombolytics secondary to time.  CTA head and neck shows diffuse diminutive intracranial arteries suggestive of vasospasm.  There is moderate to severe short segment stenosis in the right M2.  No occlusion is visualized.  This was discussed with Dr. Burdick and Dr. Bueno.  CT perfusion is negative.  Not a candidate for endovascular intervention as no vessel amenable.    PTA antiplatelet: Aspirin 81 mg  PTA anticoagulant: None      Left-sided weakness -symptoms worse from baseline.  This may represent CVA/TIA v vasospasm in the setting of cocaine use v stroke recrudescence  -TIA/ischemic stroke order set without thrombolytic therapy  -MRI brain without  -Load with aspirin and Plavix now  -Continue aspirin 81 mg daily  -Atorvastatin 80 mg daily  -Magnesium 1 g IV x1 now  -HTN: Allow autoregulation of blood  pressure for now, SBP <220  -TTE with bubble study  -A1c and lipid panel  -Ethanol level is negative  -Substance abuse: UDS is positive for cocaine, addiction counseling  -ETOH: Consider CIWA and vitamin replacment; patient tells me he only drinks on the weekends  -Tobacco abuse: Cessation counseling, nicotine patch if needed  -PT/OT/SLP eval  -Cardiac diet if passes bedside stroke dysphagia screen or when cleared by speech  -Stroke neurology will continue to follow      Plan of care was discussed with patient and Dr. Del Angel as well as Dr. Bueno.  Stroke neuro will continue to follow, thank you for including us in the care of this patient.          Margarette Ceballos, APRN  November 29, 2023  08:46 EST

## 2023-11-29 NOTE — ED PROVIDER NOTES
[place]    EMERGENCY DEPARTMENT ENCOUNTER      Pt Name: Cornelio Up  MRN: [id number]  YOB: 1980  Date of evaluation: 11/29/2023  Provider: Jorden Del Angel DO    CHIEF COMPLAINT       Chief Complaint   Patient presents with    possible stroke     HPI  Stated Reason for Visit: onset 1800 l side weakness. worse this morning. previous strokes in 2013 and in april     HISTORY OF PRESENT ILLNESS  (Location/Symptom, Timing/Onset, Context/Setting, Quality, Duration, Modifying Factors, Severity.)   Cornelio Up is a 43 y.o. male who presents to the emergency department by EMS secondary to a concern for left-sided numbness, tingling, weakness.  He notes onset of symptoms around 6 PM last night with some mild nausea, some mild tingling on the left upper and lower extremity.  He notes he drank some beers, bourbon last night while watching the basketball game, woke this morning around 430 symptoms were still persisting, slightly worse.  He denies any headache, vision changes, he notes some tingling in his left face, arm and leg and some difficulty with his ambulation.  He notes a history of CVA, prior stroke when he was 32 years old, is on aspirin, no other blood thinners.  Did have some neurological work-up in April of this year at [facility] with no focal deficit per the patient.  He does state that he had used cocaine earlier this morning, uses it very intermittently does not use it on a daily basis.  Denies any known underlying cardiac disease, he is on amlodipine for his hypertension.  He notes some intermittent chest pain, cough and congestion over the last month as well.  He denies any other acute systemic complaints at this time.      Nursing notes were reviewed.      PAST MEDICAL HISTORY     Past Medical History:   Diagnosis Date    Hypertension     Stroke          SURGICAL HISTORY     History reviewed. No pertinent surgical history.      CURRENT MEDICATIONS       Current  Facility-Administered Medications:     [START ON 11/30/2023] aspirin chewable tablet 81 mg, 81 mg, Oral, Daily, Margarette Ceballos APRN    aspirin tablet 325 mg, 325 mg, Oral, Once **OR** aspirin suppository 300 mg, 300 mg, Rectal, Once, Margarette Ceballos APRN    sodium chloride 0.9 % flush 10 mL, 10 mL, Intravenous, PRN, Jorden Del Angel, DO    Current Outpatient Medications:     amLODIPine (NORVASC) 10 MG tablet, Take 1 tablet by mouth Daily., Disp: , Rfl:     ALLERGIES     Patient has no known allergies.    FAMILY HISTORY     History reviewed. No pertinent family history.       SOCIAL HISTORY       Social History     Socioeconomic History    Marital status: Single   Substance and Sexual Activity    Drug use: Yes     Types: Cocaine(coke)         PHYSICAL EXAM    (up to 7 for level 4, 8 or more for level 5)     Vitals:    11/29/23 1048 11/29/23 1058 11/29/23 1108 11/29/23 1118   BP: (!) 136/101      Pulse: 77 76 77 77   Resp:       Temp:       SpO2: 98% 99% 98% 98%   Weight:       Height:           Physical Exam  General : Patient is awake, alert, oriented, in no acute distress, nontoxic appearing  HEENT: Pupils are equally round, EOMI, conjunctivae clear  Neck: Neck is supple, full range of motion, trachea midline  Cardiac: Heart regular rate, rhythm, no murmurs, rubs, or gallops  Lungs: Lungs are clear to auscultation, there is no wheezing, rhonchi, or rales. There is no use of accessory muscles  Abdomen: Abdomen is soft, nontender, nondistended. There are no firm or pulsatile masses, no rebound rigidity or guarding  Musculoskeletal: 5 out of 5 strength in all 4 extremities.  No focal muscle deficits are appreciated  Neuro: Patient is awake and alert's very slight delay with his responses, no slurred speech, he does have a left-sided facial droop, paresthesia and sensory deficit the left face, left arm and leg, there is very mild  strength and leg drift on the left side when compared to the right.  There  is no flaccid paralysis.  Please refer to stroke navigator for full NIH score  Dermatology: Skin is warm and dry  Psych: Mentation is grossly normal, cognition is grossly normal. Affect is appropriate      DIAGNOSTIC RESULTS     EKG:  All EKGs are interpreted by the Emergency Department Physician who either signs or Co-signs this chart in the absence of a cardiologist.    ECG 12 Lead ED Triage Standing Order; Acute Stroke (Onset <24 hrs)   Preliminary Result   Test Reason : ED Triage Standing Order~   Blood Pressure :   */*   mmHG   Vent. Rate : 104 BPM     Atrial Rate : 104 BPM      P-R Int :  92 ms          QRS Dur :  92 ms       QT Int : 346 ms       P-R-T Axes :  88  87  50 degrees      QTc Int : 454 ms      Sinus tachycardia with short CA   Moderate voltage criteria for LVH, may be normal variant   Borderline ECG   No previous ECGs available      Referred By:            Confirmed By:           RADIOLOGY:     [x] Radiologist's Report Reviewed:  XR Chest 1 View   Final Result   Impression:   No acute process.         Electronically Signed: Preeti Rush MD     11/29/2023 9:32 AM EST     Workstation ID: [id number]      CT Angiogram Head w AI Analysis of LVO   Final Result   Impression:   No calculated core infarct or ischemic tissue at risk on CT perfusion.      Diffuse diminutive caliber of the intracranial arteries which could reflect diffuse vasospasm. There is indistinct moderate or severe short segment stenosis at the origin of the inferior division of the right M2. There is suggestion of asymmetric    decrease in the number and conspicuity of distal right M2 branches compared to the left side, particularly on the MIP images.      Paranasal sinus disease.            Electronically Signed: Brayden Burdick MD     11/29/2023 9:43 AM EST     Workstation ID: [id number]      CT Angiogram Neck   Final Result   Impression:   No calculated core infarct or ischemic tissue at risk on CT perfusion.      Diffuse diminutive  caliber of the intracranial arteries which could reflect diffuse vasospasm. There is indistinct moderate or severe short segment stenosis at the origin of the inferior division of the right M2. There is suggestion of asymmetric    decrease in the number and conspicuity of distal right M2 branches compared to the left side, particularly on the MIP images.      Paranasal sinus disease.            Electronically Signed: Brayden Burdick MD     11/29/2023 9:43 AM EST     Workstation ID: [id number]      CT CEREBRAL PERFUSION WITH & WITHOUT CONTRAST   Final Result   Impression:   No calculated core infarct or ischemic tissue at risk on CT perfusion.      Diffuse diminutive caliber of the intracranial arteries which could reflect diffuse vasospasm. There is indistinct moderate or severe short segment stenosis at the origin of the inferior division of the right M2. There is suggestion of asymmetric    decrease in the number and conspicuity of distal right M2 branches compared to the left side, particularly on the MIP images.      Paranasal sinus disease.            Electronically Signed: Brayden Burdick MD     11/29/2023 9:43 AM EST     Workstation ID: [id number]      CT Head Without Contrast Stroke Protocol   Final Result   Impression:   No evidence of acute intracranial disease.            Electronically Signed: Vincent Moya MD     11/29/2023 8:50 AM EST     Workstation ID: [id number]          I ordered and independently reviewed the above noted radiographic studies.      I viewed images of CT head which showed no acute intracranial process per my independent interpretation.    See radiologist's dictation for official interpretation.      ED BEDSIDE ULTRASOUND:   Performed by ED Physician - none    LABS:    I have reviewed and interpreted all of the currently available lab results from this visit (if applicable):  Results for orders placed or performed during the hospital encounter of 11/29/23   Single High Sensitivity Troponin T     Specimen: Blood   Result Value Ref Range    HS Troponin T 8 <22 ng/L   aPTT    Specimen: Blood   Result Value Ref Range    PTT 28.7 22.0 - 39.0 seconds   CBC Auto Differential    Specimen: Blood   Result Value Ref Range    WBC 9.41 3.40 - 10.80 10*3/mm3    RBC 4.46 4.14 - 5.80 10*6/mm3    Hemoglobin 14.2 13.0 - 17.7 g/dL    Hematocrit 42.0 37.5 - 51.0 %    MCV 94.2 79.0 - 97.0 fL    MCH 31.8 26.6 - 33.0 pg    MCHC 33.8 31.5 - 35.7 g/dL    RDW 13.3 12.3 - 15.4 %    RDW-SD 45.7 37.0 - 54.0 fl    MPV 9.1 6.0 - 12.0 fL    Platelets 387 140 - 450 10*3/mm3   Comprehensive Metabolic Panel    Specimen: Blood   Result Value Ref Range    Glucose 100 (H) 65 - 99 mg/dL    BUN 7 6 - 20 mg/dL    Creatinine 0.74 (L) 0.76 - 1.27 mg/dL    Sodium 136 136 - 145 mmol/L    Potassium 3.4 (L) 3.5 - 5.2 mmol/L    Chloride 100 98 - 107 mmol/L    CO2 28.0 22.0 - 29.0 mmol/L    Calcium 8.6 8.6 - 10.5 mg/dL    Total Protein 7.4 6.0 - 8.5 g/dL    Albumin 4.0 3.5 - 5.2 g/dL    ALT (SGPT) 13 1 - 41 U/L    AST (SGOT) 27 1 - 40 U/L    Alkaline Phosphatase 64 39 - 117 U/L    Total Bilirubin 0.3 0.0 - 1.2 mg/dL    Globulin 3.4 gm/dL    A/G Ratio 1.2 g/dL    BUN/Creatinine Ratio 9.5 7.0 - 25.0    Anion Gap 8.0 5.0 - 15.0 mmol/L    eGFR 116.7 >60.0 mL/min/1.73   Urine Drug Screen - Urine, Clean Catch    Specimen: Urine, Clean Catch   Result Value Ref Range    THC, Screen, Urine Negative Negative    Phencyclidine (PCP), Urine Negative Negative    Cocaine Screen, Urine Positive (A) Negative    Methamphetamine, Ur Negative Negative    Opiate Screen Negative Negative    Amphetamine Screen, Urine Negative Negative    Benzodiazepine Screen, Urine Negative Negative    Tricyclic Antidepressants Screen Negative Negative    Methadone Screen, Urine Negative Negative    Barbiturates Screen, Urine Negative Negative    Oxycodone Screen, Urine Negative Negative    Buprenorphine, Screen, Urine Negative Negative   Ethanol    Specimen: Blood   Result Value Ref Range     Ethanol <10 0 - 10 mg/dL   Fentanyl, Urine - Urine, Clean Catch    Specimen: Urine, Clean Catch   Result Value Ref Range    Fentanyl, Urine Negative Negative   Manual Differential    Specimen: Blood   Result Value Ref Range    Neutrophil % 30.0 (L) 42.7 - 76.0 %    Lymphocyte % 27.0 19.6 - 45.3 %    Monocyte % 2.0 (L) 5.0 - 12.0 %    Eosinophil % 37.0 (H) 0.3 - 6.2 %    Basophil % 1.0 0.0 - 1.5 %    Bands %  1.0 0.0 - 5.0 %    Atypical Lymphocyte % 2.0 0.0 - 5.0 %    Neutrophils Absolute 2.92 1.70 - 7.00 10*3/mm3    Lymphocytes Absolute 2.73 0.70 - 3.10 10*3/mm3    Monocytes Absolute 0.19 0.10 - 0.90 10*3/mm3    Eosinophils Absolute 3.48 (H) 0.00 - 0.40 10*3/mm3    Basophils Absolute 0.09 0.00 - 0.20 10*3/mm3    nRBC 0.0 0.0 - 0.2 /100 WBC    RBC Morphology Normal Normal    WBC Morphology Normal Normal    Platelet Morphology Normal Normal   ECG 12 Lead ED Triage Standing Order; Acute Stroke (Onset <24 hrs)   Result Value Ref Range    QT Interval 346 ms    QTC Interval 454 ms   Green Top (Gel)   Result Value Ref Range    Extra Tube Hold for add-ons.    Lavender Top   Result Value Ref Range    Extra Tube hold for add-on    Gold Top - SST   Result Value Ref Range    Extra Tube Hold for add-ons.    Light Blue Top   Result Value Ref Range    Extra Tube Hold for add-ons.         If labs were ordered, I independently reviewed the results and considered them in treating the patient.      EMERGENCY DEPARTMENT COURSE and DIFFERENTIAL DIAGNOSIS/MDM:   Vitals:  AS OF 11:35 EST    BP - (!) 136/101  HR - 77  TEMP - 98.6 °F (37 °C)  O2 SATS - 98%      Orders placed during this visit:  Orders Placed This Encounter   Procedures    CT Head Without Contrast Stroke Protocol    CT Angiogram Head w AI Analysis of LVO    CT Angiogram Neck    CT CEREBRAL PERFUSION WITH & WITHOUT CONTRAST    XR Chest 1 View    [place] Draw    Single High Sensitivity Troponin T    aPTT    CBC Auto Differential    Comprehensive Metabolic Panel    Urine  Drug Screen - Urine, Clean Catch    Ethanol    Fentanyl, Urine - Urine, Clean Catch    Manual Differential    NPO Diet NPO Type: Strict NPO    Initiate Code Stroke    Perform NIH Stroke Scale    Measure Actual Weight    Head of Bed 30 Degrees or Less    Undress and Gown    Vital Signs    Neuro Checks    Notify MD for SBP < 80 or > 200    Notify Provider for SBP > 140 if Hemorrhagic Stroke    No Hypotonic Fluids    Nursing Dysphagia Screening (Complete Prior to Giving anything PO)    RN to Place Order SLP Consult (IF swallow screen failed) - Eval & Treat Choosing Reason of RN Dysphagia Screen Failed    Vital Signs    Pulse Oximetry, Continuous    Notify Provider    Nursing Dysphagia Screening (Complete Prior to Giving Anything By Mouth)    RN to Place Order SLP Consult - Eval & Treat Choosing Reason of RN Dysphagia Screen Failed    Neuro Checks    NIHSS Assessment    Order CT Head Without Contrast for Neurological Decline    Activity As Tolerated    Inpatient Neurology Consult Stroke    Oxygen Therapy- Nasal Cannula; Titrate 1-6 LPM Per SpO2; 90 - 95%    POC CHEM 8    POCT Protime/INR    ECG 12 Lead ED Triage Standing Order; Acute Stroke (Onset <24 hrs)    Insert Large-Bore Peripheral IV - Right AC Preferred    Initiate Observation Status    ED Bed Request    CBC & Differential    Green Top (Gel)    Lavender Top    Gold Top - SST    Gray Top    Light Blue Top       All labs have been independently reviewed by me.  All radiology studies have been reviewed by me and the radiologist dictating the report.  All EKG's have been independently viewed and interpreted by me.      Discussion below represents my analysis of pertinent findings related to patient's condition, differential diagnosis, treatment plan and final disposition.    Differential diagnosis:  The differential diagnosis associated with the patient's presentation includes: CVA, TIA, vasospasm, substance abuse, hypertensive urgency    Additional  sources  Discussed/ obtained information from independent historians:   [] Spouse  [] Parent  [] Family member  [] Friend  [x] EMS   [] Other:    External (non-ED) record review:   [] Inpatient record:   [] Office record:   [] Outpatient record:   [] Prior Outpatient labs:   [] Prior Outpatient radiology:   [] Primary Care record:   [] Outside ED record:   [] Other:     Patient's care impacted by:   [] Diabetes  [x] Hypertension  [] CHF  [] Hyperlipidemia  [] Coronary Artery Disease   [] COPD   [] Cancer   [] Tobacco Abuse   [x] Substance Abuse, cocaine use   [] Other:     Care significantly affected by Social Determinants of Health (housing and economic circumstances, unemployment)    [] Yes     [x] No   If yes, Patient's care significantly limited by Social Determinants of Health including:   [] Inadequate housing   [] Low income   [] Alcoholism and drug addiction in family   [] Problems related to primary support group   [] Unemployment   [] Problems related to employment   [] Other Social Determinants of Health:       MEDICATIONS ADMINISTERED IN ED:  Medications   sodium chloride 0.9 % flush 10 mL (has no administration in time range)   aspirin tablet 325 mg (325 mg Oral Not Given 11/29/23 0937)     Or   aspirin suppository 300 mg ( Rectal Not Given:  See Alt 11/29/23 0937)   aspirin chewable tablet 81 mg (has no administration in time range)   iopamidol (ISOVUE-370) 76 % injection 115 mL (115 mL Intravenous Given 11/29/23 0845)   clopidogrel (PLAVIX) tablet 300 mg (300 mg Oral Given 11/29/23 0941)   magnesium sulfate in D5W 1g/100mL (PREMIX) (0 g Intravenous Stopped 11/29/23 1111)              43-year-old male presents via EMS as a code stroke, seen immediately by myself and stroke navigator to CT scanner on arrival.  Initial CT head without any acute intracranial normalities.  The patient has a history of previous CVA, more advanced work-up including angiography and perfusion studies will be obtained.  He is  outside of TNK window as his symptom onset was approximately 6:00 yesterday evening.  Patient labs are with any significant abnormalities, urine drug screen is positive for cocaine which the patient told us upon presentation.  We will move forward with dual antiplatelet therapy, plan for admission for further neurological work-up and evaluation.  Case discussed with hospitalist, Dr. Witt, for admission.    PROCEDURES:  Procedures    CRITICAL CARE TIME    Total Critical Care time was 30 minutes, excluding separately reportable procedures.  Patient presenting with neurological deficit, code stroke evaluation required multiple neurochecks, reassessments, discussion with consultants. There was a high probability of clinically significant/life threatening deterioration in the patient's condition which required my urgent intervention.      FINAL IMPRESSION      1. Cerebrovascular accident (CVA), unspecified mechanism    2. Arm paresthesia, left          DISPOSITION/PLAN     ED Disposition       ED Disposition   Decision to Admit    Condition   --    Comment   Level of Care: Telemetry [5]   Diagnosis: Arm numbness left [146228]   Admitting Physician: RANDELL WITT [[id number]]                   Comment: Please note this report has been produced using speech recognition software.      Jorden Del Angel DO  Attending Emergency Physician         Jorden Del Angel DO  11/29/23 1132

## 2023-11-29 NOTE — Clinical Note
Level of Care: Telemetry [5]   Diagnosis: Arm numbness left [[id number]]   Admitting Physician: RANDELL WITT [[id number]]

## 2023-11-29 NOTE — H&P
"    [facility] Medicine Services  HISTORY AND PHYSICAL    Patient Name: Cornelio Up  : 1980  MRN: [id number]  Primary Care Physician: Jasbir, No Known  Date of admission: 2023      Subjective   Subjective     Chief Complaint:  L sided weakness/numbness     HPI:  Cornelio Up is a 43 y.o. male with PMHx prior stroke in  and 2023 with residual mild L leg weakness and numbness, HTN, tobacco abuse, EtOH abuse, polysubstance abuse who presents to [facility] ED with worsening L sided weakness, numbness and difficulty speaking. Patient also admits to nausea. Sx started around 1800 last night he had some nausea. He then started having some increased L sided weakness and numbness. Per chart, patient drank a few beers and had some [place] last night to watch [employer] basketball game. He woke up around 0430 this AM and his Sx seemed worse. He did use cocaine this morning which he does use \"occasionally\" per his report.   CODE STROKE called on arrival to ED. He had mild dysarthria, subtle left facial droop. Strength 4/5 on L side. CT imaging in ED with concern for diffuse vasospasm, likely secondary to cocaine use. Further stroke work-up pending. Hospital Medicine asked to admit.    Personal History     Past Medical History:   Diagnosis Date    Hypertension     Stroke              History reviewed. No pertinent surgical history.    Family History: family history is not on file.     Social History:  reports current drug use. Drug: Cocaine(coke).  Social History     Social History Narrative    Not on file       Medications:  Available home medication information reviewed.  (Not in a hospital admission)      No Known Allergies    Objective   Objective     Vital Signs:   Temp:  [98.6 °F (37 °C)] 98.6 °F (37 °C)  Heart Rate:  [] 77  Resp:  [16] 16  BP: (136-180)/() 136/101  Total (NIH Stroke Scale): 3    Physical Exam   Constitutional: Awakens easily, NAD  Eyes: PERRLA, sclerae " anicteric, no conjunctival injection  HENT: NCAT, mucous membranes moist  Neck: Supple, no thyromegaly, no lymphadenopathy, trachea midline  Respiratory: Clear to auscultation bilaterally, nonlabored respirations   Cardiovascular: RRR, no murmurs, rubs, or gallops, palpable pedal pulses bilaterally  Gastrointestinal: Positive bowel sounds, soft, nontender, nondistended  Musculoskeletal: No bilateral ankle edema, no clubbing or cyanosis to extremities  Psychiatric: Flat affect, cooperative  Neurologic: Oriented x 3, mild dysarthria, strength 4/5 LUE/LE  Skin: No rashes     Result Review:  I have personally reviewed the results from the time of this admission to 11/29/2023 12:12 EST and agree with these findings:  [x]  Laboratory list / accordion  []  Microbiology  [x]  Radiology  []  EKG/Telemetry   []  Cardiology/Vascular   []  Pathology  []  Old records  []  Other:    LAB RESULTS:      Lab 11/29/23  0845   WBC 9.41   HEMOGLOBIN 14.2   HEMATOCRIT 42.0   PLATELETS 387   NEUTROS ABS 2.92   EOS ABS 3.48*   MCV 94.2   APTT 28.7         Lab 11/29/23  0906   SODIUM 136   POTASSIUM 3.4*   CHLORIDE 100   CO2 28.0   ANION GAP 8.0   BUN 7   CREATININE 0.74*   EGFR 116.7   GLUCOSE 100*   CALCIUM 8.6         Lab 11/29/23  0906   TOTAL PROTEIN 7.4   ALBUMIN 4.0   GLOBULIN 3.4   ALT (SGPT) 13   AST (SGOT) 27   BILIRUBIN 0.3   ALK PHOS 64         Lab 11/29/23  0906   HSTROP T 8                     Microbiology Results (last 10 days)       ** No results found for the last 240 hours. **            CT Angiogram Head w AI Analysis of LVO    Result Date: 11/29/2023  CT CEREBRAL PERFUSION W WO CONTRAST, CT ANGIOGRAM NECK, CT ANGIOGRAM HEAD W AI ANALYSIS OF LVO Date of Exam: 11/29/2023 8:35 AM EST Indication: Neuro deficit, acute stroke suspected.  Comparison: Concurrent noncontrast head CT Technique: Axial CT images of the brain were obtained prior to and after the administration of 115 mL Isovue-370. Core blood volume, core blood flow,  mean transit time, and Tmax images were obtained utilizing the Rapid software protocol. A limited CT angiogram of the head was also performed to measure the blood vessel density. CTA of the head and neck was performed after the uneventful intravenous administration of above contrast. Reconstructed coronal and sagittal images were also obtained. In addition, a 3-D volume rendered image was created for interpretation. Automated exposure control and iterative reconstruction methods were used. The radiation dose reduction device was turned on for each scan per the ALARA (As Low as Reasonably Achievable) protocol. Findings: CT cerebral perfusion: No core infarct or ischemic tissue at risk. There is mild patchy hypoperfusion in the watershed areas bilaterally, nonspecific. CTA HEAD: No abrupt cut off/large vessel occlusion, dissection, or aneurysm. There is relatively diffuse diminutive caliber of the intracranial arteries of both the anterior and posterior circulation. There is ill-defined moderate or severe short segment stenosis/indistinctness at the origin of the inferior division of the right M2 (series 10 image [id number]). Compared to the left side there appears to be decreased number of well-opacified distal right M2 branches; this is most apparent on the MIP images. The cerebral veins and dural venous sinuses are grossly patent. CTA NECK: No abrupt cut off/large vessel occlusion, flow-limiting stenosis, dissection, or aneurysm. No appreciable atherosclerosis. Extravascular findings: The upper lungs are grossly clear. Homogeneous attenuation of the thyroid gland. No pharyngeal or laryngeal mass lesion allowing for some motion artifact at the level of the larynx. No acute or suspicious bony findings. There is paranasal sinus disease.     Impression: Impression: No calculated core infarct or ischemic tissue at risk on CT perfusion. Diffuse diminutive caliber of the intracranial arteries which could reflect diffuse  vasospasm. There is indistinct moderate or severe short segment stenosis at the origin of the inferior division of the right M2. There is suggestion of asymmetric decrease in the number and conspicuity of distal right M2 branches compared to the left side, particularly on the MIP images. Paranasal sinus disease. Electronically Signed: Brayden Burdick MD  11/29/2023 9:43 AM EST  Workstation ID: [id number]    CT Angiogram Neck    Result Date: 11/29/2023  CT CEREBRAL PERFUSION W WO CONTRAST, CT ANGIOGRAM NECK, CT ANGIOGRAM HEAD W AI ANALYSIS OF LVO Date of Exam: 11/29/2023 8:35 AM EST Indication: Neuro deficit, acute stroke suspected.  Comparison: Concurrent noncontrast head CT Technique: Axial CT images of the brain were obtained prior to and after the administration of 115 mL Isovue-370. Core blood volume, core blood flow, mean transit time, and Tmax images were obtained utilizing the Rapid software protocol. A limited CT angiogram of the head was also performed to measure the blood vessel density. CTA of the head and neck was performed after the uneventful intravenous administration of above contrast. Reconstructed coronal and sagittal images were also obtained. In addition, a 3-D volume rendered image was created for interpretation. Automated exposure control and iterative reconstruction methods were used. The radiation dose reduction device was turned on for each scan per the ALARA (As Low as Reasonably Achievable) protocol. Findings: CT cerebral perfusion: No core infarct or ischemic tissue at risk. There is mild patchy hypoperfusion in the watershed areas bilaterally, nonspecific. CTA HEAD: No abrupt cut off/large vessel occlusion, dissection, or aneurysm. There is relatively diffuse diminutive caliber of the intracranial arteries of both the anterior and posterior circulation. There is ill-defined moderate or severe short segment stenosis/indistinctness at the origin of the inferior division of the right M2  (series 10 image [id number]). Compared to the left side there appears to be decreased number of well-opacified distal right M2 branches; this is most apparent on the MIP images. The cerebral veins and dural venous sinuses are grossly patent. CTA NECK: No abrupt cut off/large vessel occlusion, flow-limiting stenosis, dissection, or aneurysm. No appreciable atherosclerosis. Extravascular findings: The upper lungs are grossly clear. Homogeneous attenuation of the thyroid gland. No pharyngeal or laryngeal mass lesion allowing for some motion artifact at the level of the larynx. No acute or suspicious bony findings. There is paranasal sinus disease.     Impression: Impression: No calculated core infarct or ischemic tissue at risk on CT perfusion. Diffuse diminutive caliber of the intracranial arteries which could reflect diffuse vasospasm. There is indistinct moderate or severe short segment stenosis at the origin of the inferior division of the right M2. There is suggestion of asymmetric decrease in the number and conspicuity of distal right M2 branches compared to the left side, particularly on the MIP images. Paranasal sinus disease. Electronically Signed: Brayden Burdick MD  11/29/2023 9:43 AM EST  Workstation ID: [id number]    CT CEREBRAL PERFUSION WITH & WITHOUT CONTRAST    Result Date: 11/29/2023  CT CEREBRAL PERFUSION W WO CONTRAST, CT ANGIOGRAM NECK, CT ANGIOGRAM HEAD W AI ANALYSIS OF LVO Date of Exam: 11/29/2023 8:35 AM EST Indication: Neuro deficit, acute stroke suspected.  Comparison: Concurrent noncontrast head CT Technique: Axial CT images of the brain were obtained prior to and after the administration of 115 mL Isovue-370. Core blood volume, core blood flow, mean transit time, and Tmax images were obtained utilizing the Rapid software protocol. A limited CT angiogram of the head was also performed to measure the blood vessel density. CTA of the head and neck was performed after the uneventful intravenous  administration of above contrast. Reconstructed coronal and sagittal images were also obtained. In addition, a 3-D volume rendered image was created for interpretation. Automated exposure control and iterative reconstruction methods were used. The radiation dose reduction device was turned on for each scan per the ALARA (As Low as Reasonably Achievable) protocol. Findings: CT cerebral perfusion: No core infarct or ischemic tissue at risk. There is mild patchy hypoperfusion in the watershed areas bilaterally, nonspecific. CTA HEAD: No abrupt cut off/large vessel occlusion, dissection, or aneurysm. There is relatively diffuse diminutive caliber of the intracranial arteries of both the anterior and posterior circulation. There is ill-defined moderate or severe short segment stenosis/indistinctness at the origin of the inferior division of the right M2 (series 10 image [id number]). Compared to the left side there appears to be decreased number of well-opacified distal right M2 branches; this is most apparent on the MIP images. The cerebral veins and dural venous sinuses are grossly patent. CTA NECK: No abrupt cut off/large vessel occlusion, flow-limiting stenosis, dissection, or aneurysm. No appreciable atherosclerosis. Extravascular findings: The upper lungs are grossly clear. Homogeneous attenuation of the thyroid gland. No pharyngeal or laryngeal mass lesion allowing for some motion artifact at the level of the larynx. No acute or suspicious bony findings. There is paranasal sinus disease.     Impression: Impression: No calculated core infarct or ischemic tissue at risk on CT perfusion. Diffuse diminutive caliber of the intracranial arteries which could reflect diffuse vasospasm. There is indistinct moderate or severe short segment stenosis at the origin of the inferior division of the right M2. There is suggestion of asymmetric decrease in the number and conspicuity of distal right M2 branches compared to the left  side, particularly on the MIP images. Paranasal sinus disease. Electronically Signed: Brayden Burdick MD  11/29/2023 9:43 AM EST  Workstation ID: [id number]    XR Chest 1 View    Result Date: 11/29/2023  XR CHEST 1 VW Date of Exam: 11/29/2023 9:27 AM EST Indication: Acute Stroke Protocol (onset < 12 hrs) Comparison: None available. Findings: Lung fields are well inflated. There are no acute lung infiltrates or effusions. Heart and pulmonary vessels appear within normal limits. There is a mild S-shaped thoracic scoliosis.     Impression: Impression: No acute process. Electronically Signed: Preeti Rush MD  11/29/2023 9:32 AM EST  Workstation ID: [id number]    CT Head Without Contrast Stroke Protocol    Result Date: 11/29/2023  CT HEAD WO CONTRAST STROKE PROTOCOL Date of Exam: 11/29/2023 8:32 AM EST Indication: Neuro deficit, acute, stroke suspected Neuro deficit, acute stroke suspected. Comparison: None available. Technique: Axial CT images were obtained of the head without contrast administration.  Reconstructed coronal images were also obtained. Automated exposure control and iterative construction methods were used. Scan Time: 8:31 a.m. Results discussed with Dr. Del Angel at 8:35 a.m., 11/29/2023. Findings: The calvarium appears intact. There is extensive mucosal thickening of the ethmoid sinuses, left sphenoid sinus, and maxillary sinuses. There is milder frontal sinus disease. Mastoid air cells appear clear. There are small symmetric normal variant dilated perivascular spaces in both medial temporal lobes. There is no evidence of edema/infarct, intracranial hemorrhage, mass or mass effect, hydrocephalus, or abnormal extra-axial collection. No unusual focal or generalized encephalomalacia is seen. No significant white matter changes are appreciated.     Impression: Impression: No evidence of acute intracranial disease. Electronically Signed: Vincent Moya MD  11/29/2023 8:50 AM EST  Workstation ID: [id number]          Assessment & Plan   Assessment & Plan     Active Hospital Problems    Diagnosis  POA    **Arm numbness left [R20.0]  Yes    Cocaine use [F14.90]  Unknown    History of stroke [Z86.73]  Not Applicable    Alcohol use [Z78.9]  Unknown     L sided weakness, numbness  Hx stroke x2 with residual mild L leg weakness   Cocaine Use  -CODE STROKE called on arrival to ED. Stroke team following  -CT head negative  -CT perfusion with no core infarct   -CTA H/N with possible diffuse vasospasm   -MRI brain pending  -Echo with bubble pending  -Lipid panel, Hga1c  -PT/OT/SLP  -Neuro checks   -ASA 81mg daily + Liptior 80mg     Hx EtOH use  Cocaine use   -CIWA with PRN benzos   -Folic acid, thiamine, MTV   -Chemical dependency consult     Hypokalemia  -Replace per protocol     DVT prophylaxis:  Mechanical     CODE STATUS:  Full Code   There are no questions and answers to display.       Expected Discharge   Expected Discharge Date: 11/30/2023; Expected Discharge Time:      Rossy Johnson DO  11/29/23

## 2023-11-30 VITALS
RESPIRATION RATE: 16 BRPM | DIASTOLIC BLOOD PRESSURE: 84 MMHG | TEMPERATURE: 98.2 F | SYSTOLIC BLOOD PRESSURE: 127 MMHG | WEIGHT: 175.04 LBS | HEIGHT: 68 IN | HEART RATE: 83 BPM | OXYGEN SATURATION: 98 % | BODY MASS INDEX: 26.53 KG/M2

## 2023-11-30 LAB
CHOLEST SERPL-MCNC: 140 MG/DL (ref 0–200)
HBA1C MFR BLD: 5.2 % (ref 4.8–5.6)
HDLC SERPL-MCNC: 67 MG/DL (ref 40–60)
LDLC SERPL CALC-MCNC: 55 MG/DL (ref 0–100)
LDLC/HDLC SERPL: 0.8 {RATIO}
TRIGL SERPL-MCNC: 98 MG/DL (ref 0–150)
VLDLC SERPL-MCNC: 18 MG/DL (ref 5–40)

## 2023-11-30 PROCEDURE — G0378 HOSPITAL OBSERVATION PER HR: HCPCS

## 2023-11-30 PROCEDURE — 80061 LIPID PANEL: CPT | Performed by: NURSE PRACTITIONER

## 2023-11-30 PROCEDURE — 25010000002 THIAMINE PER 100 MG: Performed by: FAMILY MEDICINE

## 2023-11-30 PROCEDURE — 96361 HYDRATE IV INFUSION ADD-ON: CPT

## 2023-11-30 PROCEDURE — 97165 OT EVAL LOW COMPLEX 30 MIN: CPT

## 2023-11-30 PROCEDURE — 97161 PT EVAL LOW COMPLEX 20 MIN: CPT

## 2023-11-30 PROCEDURE — 99214 OFFICE O/P EST MOD 30 MIN: CPT | Performed by: STUDENT IN AN ORGANIZED HEALTH CARE EDUCATION/TRAINING PROGRAM

## 2023-11-30 PROCEDURE — 96376 TX/PRO/DX INJ SAME DRUG ADON: CPT

## 2023-11-30 PROCEDURE — 92523 SPEECH SOUND LANG COMPREHEN: CPT

## 2023-11-30 PROCEDURE — 99232 SBSQ HOSP IP/OBS MODERATE 35: CPT | Performed by: INTERNAL MEDICINE

## 2023-11-30 PROCEDURE — 83036 HEMOGLOBIN GLYCOSYLATED A1C: CPT | Performed by: NURSE PRACTITIONER

## 2023-11-30 PROCEDURE — 94761 N-INVAS EAR/PLS OXIMETRY MLT: CPT

## 2023-11-30 RX ORDER — ASPIRIN 81 MG/1
81 TABLET, CHEWABLE ORAL DAILY
Qty: 60 TABLET | Refills: 0 | Status: SHIPPED | OUTPATIENT
Start: 2023-12-01 | End: 2024-01-30

## 2023-11-30 RX ORDER — MAGNESIUM OXIDE 400 MG/1
400 TABLET ORAL DAILY
Qty: 60 TABLET | Refills: 0 | Status: SHIPPED | OUTPATIENT
Start: 2023-11-30 | End: 2024-01-29

## 2023-11-30 RX ADMIN — FOLIC ACID 1 MG: 1 TABLET ORAL at 08:42

## 2023-11-30 RX ADMIN — Medication 1 TABLET: at 08:42

## 2023-11-30 RX ADMIN — THIAMINE HYDROCHLORIDE 200 MG: 100 INJECTION, SOLUTION INTRAMUSCULAR; INTRAVENOUS at 14:05

## 2023-11-30 RX ADMIN — THIAMINE HYDROCHLORIDE 200 MG: 100 INJECTION, SOLUTION INTRAMUSCULAR; INTRAVENOUS at 05:21

## 2023-11-30 RX ADMIN — Medication 10 ML: at 08:42

## 2023-11-30 RX ADMIN — ASPIRIN 81 MG: 81 TABLET, CHEWABLE ORAL at 08:42

## 2023-11-30 NOTE — DISCHARGE SUMMARY
[facility] Medicine Services  DISCHARGE SUMMARY    Patient Name: Cornelio Up  : 1980  MRN: [id number]    Date of Admission: 2023  8:33 AM  Date of Discharge: 2023  Primary Care Physician: Provider, No Known    Consults       Date and Time Order Name Status Description    2023  8:32 AM Inpatient Neurology Consult Stroke Completed             Hospital Course       Active Hospital Problems    Diagnosis  POA    **Arm numbness left [R20.0]  Yes    Cocaine use [F14.90]  Unknown    History of stroke [Z86.73]  Not Applicable    Alcohol use [Z78.9]  Unknown      Resolved Hospital Problems   No resolved problems to display.          Hospital Course:  Cornelio Up is a 43 y.o. male history of CVA  and  with residual mild left leg weakness and numbness, hypertension, tobacco and EtOH abuse, polysubstance abuse who presented to the ED with left-sided weakness and numbness     Left-sided weakness and numbness  History of prior CVA with residual left leg weakness and numbness  -CT head is negative, CT perfusion concerning for diffuse vasospasm likely secondary to cocaine  -MRI brain was negative for any acute infarct  -Stroke neurology followed, suspect this patient had a TIA secondary to a spasm from cocaine abuse, recommend aspirin 81 mg daily, continue magnesium 40 mg daily, he has been extensively counseled on cessation of cocaine use  -PT/OT evaluated patient and he is okay to discharge home  -Follow-up in stroke clinic in 1 month     Hypokalemia  -Continue to replete per protocol     EtOH abuse  Polysubstance abuse  -UDS positive for cocaine  -s/p CIWA  -Folic acid, thiamine and multivitamins      Discharge Follow Up Recommendations for outpatient labs/diagnostics:  With PCP in 1 week  Follow-up with stroke clinic in 1 month    Day of Discharge     HPI: No acute events overnight, patient states that he feels better but not completely back to baseline, he was seen  by PT and was okay for discharge home       Review of Systems  Gen- No fevers, chills  CV- No chest pain, palpitations  Resp- No cough, dyspnea  GI- No N/V/D, abd pain     Vital Signs:   Temp:  [97.8 °F (36.6 °C)-98.6 °F (37 °C)] 98.2 °F (36.8 °C)  Heart Rate:  [69-91] 83  Resp:  [16] 16  BP: (127-147)/(84-99) 127/84      Physical Exam:  Constitutional: No acute distress, awake, alert  HENT: NCAT, mucous membranes moist  Respiratory: Clear to auscultation bilaterally, respiratory effort normal   Cardiovascular: RRR, no murmurs, rubs, or gallops  Gastrointestinal: Positive bowel sounds, soft, nontender, nondistended  Musculoskeletal: No bilateral ankle edema  Psychiatric: Appropriate affect, cooperative  Neurologic: Oriented x 3, strength symmetric in all extremities  Skin: No rashes    Pertinent  and/or Most Recent Results     LAB RESULTS:      Lab 11/29/23  0845   WBC 9.41   HEMOGLOBIN 14.2   HEMATOCRIT 42.0   PLATELETS 387   NEUTROS ABS 2.92   EOS ABS 3.48*   MCV 94.2   APTT 28.7         Lab 11/30/23  0530 11/29/23 2026 11/29/23  0906   SODIUM  --   --  136   POTASSIUM  --  3.7 3.4*   CHLORIDE  --   --  100   CO2  --   --  28.0   ANION GAP  --   --  8.0   BUN  --   --  7   CREATININE  --   --  0.74*   EGFR  --   --  116.7   GLUCOSE  --   --  100*   CALCIUM  --   --  8.6   HEMOGLOBIN A1C 5.20  --   --          Lab 11/29/23  0906   TOTAL PROTEIN 7.4   ALBUMIN 4.0   GLOBULIN 3.4   ALT (SGPT) 13   AST (SGOT) 27   BILIRUBIN 0.3   ALK PHOS 64         Lab 11/29/23  0906   HSTROP T 8         Lab 11/30/23  0530   CHOLESTEROL 140   LDL CHOL 55   HDL CHOL 67*   TRIGLYCERIDES 98             Brief Urine Lab Results       None          Microbiology Results (last 10 days)       ** No results found for the last 240 hours. **            MRI Brain Without Contrast    Result Date: 11/29/2023  MRI BRAIN WO CONTRAST Date of Exam: 11/29/2023 8:23 PM CST Indication: Stroke, follow up.  Comparison: None available. Technique:  Routine  multiplanar/multisequence sequence images of the brain were obtained without contrast administration. Findings: There is no diffusion restriction to suggest acute infarct. There is no evidence of acute or chronic intracranial hemorrhage. No mass effect or midline shift. No abnormal extra-axial collections. Trace nonspecific periventricular white matter changes likely reflective of early microvascular ischemic disease. No mass effect. The basal ganglia, brainstem and cerebellum appear within normal limits. Midline structures are intact. No significant cortical abnormality is identified. Calvarial and superficial soft tissue signal is within normal limits. Orbits appear unremarkable. There is mild to moderate diffuse paranasal sinus mucosal thickening without air-fluid levels.     Impression: 1.No acute intracranial process no evidence of infarction. 2.Minimal nonspecific white matter change. Electronically Signed: Ronaldo Brower MD  11/29/2023 8:52 PM [facility]  Workstation ID: [id number]    Adult Transthoracic Echo Complete W/ Cont if Necessary Per Protocol (With Agitated Saline)    Result Date: 11/29/2023    Left ventricular systolic function is normal. Left ventricular ejection fraction appears to be 56 - 60%.   The left ventricular cavity is mildly dilated.   Saline test results are negative.   No significant valvular abnormalities.     CT Angiogram Head w AI Analysis of LVO    Result Date: 11/29/2023  CT CEREBRAL PERFUSION W WO CONTRAST, CT ANGIOGRAM NECK, CT ANGIOGRAM HEAD W AI ANALYSIS OF LVO Date of Exam: 11/29/2023 8:35 AM EST Indication: Neuro deficit, acute stroke suspected.  Comparison: Concurrent noncontrast head CT Technique: Axial CT images of the brain were obtained prior to and after the administration of 115 mL Isovue-370. Core blood volume, core blood flow, mean transit time, and Tmax images were obtained utilizing the Rapid software protocol. A limited CT angiogram of the head was also performed to measure  the blood vessel density. CTA of the head and neck was performed after the uneventful intravenous administration of above contrast. Reconstructed coronal and sagittal images were also obtained. In addition, a 3-D volume rendered image was created for interpretation. Automated exposure control and iterative reconstruction methods were used. The radiation dose reduction device was turned on for each scan per the ALARA (As Low as Reasonably Achievable) protocol. Findings: CT cerebral perfusion: No core infarct or ischemic tissue at risk. There is mild patchy hypoperfusion in the watershed areas bilaterally, nonspecific. CTA HEAD: No abrupt cut off/large vessel occlusion, dissection, or aneurysm. There is relatively diffuse diminutive caliber of the intracranial arteries of both the anterior and posterior circulation. There is ill-defined moderate or severe short segment stenosis/indistinctness at the origin of the inferior division of the right M2 (series 10 image [id number]). Compared to the left side there appears to be decreased number of well-opacified distal right M2 branches; this is most apparent on the MIP images. The cerebral veins and dural venous sinuses are grossly patent. CTA NECK: No abrupt cut off/large vessel occlusion, flow-limiting stenosis, dissection, or aneurysm. No appreciable atherosclerosis. Extravascular findings: The upper lungs are grossly clear. Homogeneous attenuation of the thyroid gland. No pharyngeal or laryngeal mass lesion allowing for some motion artifact at the level of the larynx. No acute or suspicious bony findings. There is paranasal sinus disease.     Impression: No calculated core infarct or ischemic tissue at risk on CT perfusion. Diffuse diminutive caliber of the intracranial arteries which could reflect diffuse vasospasm. There is indistinct moderate or severe short segment stenosis at the origin of the inferior division of the right M2. There is suggestion of asymmetric  decrease in the number and conspicuity of distal right M2 branches compared to the left side, particularly on the MIP images. Paranasal sinus disease. Electronically Signed: Brayden Burdick MD  11/29/2023 9:43 AM EST  Workstation ID: [id number]    CT Angiogram Neck    Result Date: 11/29/2023  CT CEREBRAL PERFUSION W WO CONTRAST, CT ANGIOGRAM NECK, CT ANGIOGRAM HEAD W AI ANALYSIS OF LVO Date of Exam: 11/29/2023 8:35 AM EST Indication: Neuro deficit, acute stroke suspected.  Comparison: Concurrent noncontrast head CT Technique: Axial CT images of the brain were obtained prior to and after the administration of 115 mL Isovue-370. Core blood volume, core blood flow, mean transit time, and Tmax images were obtained utilizing the Rapid software protocol. A limited CT angiogram of the head was also performed to measure the blood vessel density. CTA of the head and neck was performed after the uneventful intravenous administration of above contrast. Reconstructed coronal and sagittal images were also obtained. In addition, a 3-D volume rendered image was created for interpretation. Automated exposure control and iterative reconstruction methods were used. The radiation dose reduction device was turned on for each scan per the ALARA (As Low as Reasonably Achievable) protocol. Findings: CT cerebral perfusion: No core infarct or ischemic tissue at risk. There is mild patchy hypoperfusion in the watershed areas bilaterally, nonspecific. CTA HEAD: No abrupt cut off/large vessel occlusion, dissection, or aneurysm. There is relatively diffuse diminutive caliber of the intracranial arteries of both the anterior and posterior circulation. There is ill-defined moderate or severe short segment stenosis/indistinctness at the origin of the inferior division of the right M2 (series 10 image [id number]). Compared to the left side there appears to be decreased number of well-opacified distal right M2 branches; this is most apparent on the MIP  images. The cerebral veins and dural venous sinuses are grossly patent. CTA NECK: No abrupt cut off/large vessel occlusion, flow-limiting stenosis, dissection, or aneurysm. No appreciable atherosclerosis. Extravascular findings: The upper lungs are grossly clear. Homogeneous attenuation of the thyroid gland. No pharyngeal or laryngeal mass lesion allowing for some motion artifact at the level of the larynx. No acute or suspicious bony findings. There is paranasal sinus disease.     Impression: No calculated core infarct or ischemic tissue at risk on CT perfusion. Diffuse diminutive caliber of the intracranial arteries which could reflect diffuse vasospasm. There is indistinct moderate or severe short segment stenosis at the origin of the inferior division of the right M2. There is suggestion of asymmetric decrease in the number and conspicuity of distal right M2 branches compared to the left side, particularly on the MIP images. Paranasal sinus disease. Electronically Signed: Brayden Burdick MD  11/29/2023 9:43 AM EST  Workstation ID: [id number]    CT CEREBRAL PERFUSION WITH & WITHOUT CONTRAST    Result Date: 11/29/2023  CT CEREBRAL PERFUSION W WO CONTRAST, CT ANGIOGRAM NECK, CT ANGIOGRAM HEAD W AI ANALYSIS OF LVO Date of Exam: 11/29/2023 8:35 AM EST Indication: Neuro deficit, acute stroke suspected.  Comparison: Concurrent noncontrast head CT Technique: Axial CT images of the brain were obtained prior to and after the administration of 115 mL Isovue-370. Core blood volume, core blood flow, mean transit time, and Tmax images were obtained utilizing the Rapid software protocol. A limited CT angiogram of the head was also performed to measure the blood vessel density. CTA of the head and neck was performed after the uneventful intravenous administration of above contrast. Reconstructed coronal and sagittal images were also obtained. In addition, a 3-D volume rendered image was created for interpretation. Automated  exposure control and iterative reconstruction methods were used. The radiation dose reduction device was turned on for each scan per the ALARA (As Low as Reasonably Achievable) protocol. Findings: CT cerebral perfusion: No core infarct or ischemic tissue at risk. There is mild patchy hypoperfusion in the watershed areas bilaterally, nonspecific. CTA HEAD: No abrupt cut off/large vessel occlusion, dissection, or aneurysm. There is relatively diffuse diminutive caliber of the intracranial arteries of both the anterior and posterior circulation. There is ill-defined moderate or severe short segment stenosis/indistinctness at the origin of the inferior division of the right M2 (series 10 image [id number]). Compared to the left side there appears to be decreased number of well-opacified distal right M2 branches; this is most apparent on the MIP images. The cerebral veins and dural venous sinuses are grossly patent. CTA NECK: No abrupt cut off/large vessel occlusion, flow-limiting stenosis, dissection, or aneurysm. No appreciable atherosclerosis. Extravascular findings: The upper lungs are grossly clear. Homogeneous attenuation of the thyroid gland. No pharyngeal or laryngeal mass lesion allowing for some motion artifact at the level of the larynx. No acute or suspicious bony findings. There is paranasal sinus disease.     Impression: No calculated core infarct or ischemic tissue at risk on CT perfusion. Diffuse diminutive caliber of the intracranial arteries which could reflect diffuse vasospasm. There is indistinct moderate or severe short segment stenosis at the origin of the inferior division of the right M2. There is suggestion of asymmetric decrease in the number and conspicuity of distal right M2 branches compared to the left side, particularly on the MIP images. Paranasal sinus disease. Electronically Signed: Brayden Burdick MD  11/29/2023 9:43 AM EST  Workstation ID: [id number]    XR Chest 1 View    Result Date:  11/29/2023  XR CHEST 1 VW Date of Exam: 11/29/2023 9:27 AM EST Indication: Acute Stroke Protocol (onset < 12 hrs) Comparison: None available. Findings: Lung fields are well inflated. There are no acute lung infiltrates or effusions. Heart and pulmonary vessels appear within normal limits. There is a mild S-shaped thoracic scoliosis.     Impression: No acute process. Electronically Signed: Preeti Rush MD  11/29/2023 9:32 AM EST  Workstation ID: [id number]    CT Head Without Contrast Stroke Protocol    Result Date: 11/29/2023  CT HEAD WO CONTRAST STROKE PROTOCOL Date of Exam: 11/29/2023 8:32 AM EST Indication: Neuro deficit, acute, stroke suspected Neuro deficit, acute stroke suspected. Comparison: None available. Technique: Axial CT images were obtained of the head without contrast administration.  Reconstructed coronal images were also obtained. Automated exposure control and iterative construction methods were used. Scan Time: 8:31 a.m. Results discussed with Dr. Del Angle at 8:35 a.m., 11/29/2023. Findings: The calvarium appears intact. There is extensive mucosal thickening of the ethmoid sinuses, left sphenoid sinus, and maxillary sinuses. There is milder frontal sinus disease. Mastoid air cells appear clear. There are small symmetric normal variant dilated perivascular spaces in both medial temporal lobes. There is no evidence of edema/infarct, intracranial hemorrhage, mass or mass effect, hydrocephalus, or abnormal extra-axial collection. No unusual focal or generalized encephalomalacia is seen. No significant white matter changes are appreciated.     Impression: No evidence of acute intracranial disease. Electronically Signed: Vincent Moya MD  11/29/2023 8:50 AM EST  Workstation ID: [id number]    XR chest 2 views    Result Date: 11/21/2023  PORTABLE CHEST    11/21/2023 10:36 PM HISTORY:  Precordial chest pain. COMPARISON: September 2023. FINDINGS: The heart is proper size. The mediastinum is unremarkable. The  lungs are clear. There is no pneumothorax. The osseous structures are unremarkable.    No acute cardiopulmonary process.             Results for orders placed during the hospital encounter of 11/29/23    Adult Transthoracic Echo Complete W/ Cont if Necessary Per Protocol (With Agitated Saline)    Interpretation Summary    Left ventricular systolic function is normal. Left ventricular ejection fraction appears to be 56 - 60%.    The left ventricular cavity is mildly dilated.    Saline test results are negative.    No significant valvular abnormalities.      Plan for Follow-up of Pending Labs/Results:     Discharge Details        Discharge Medications        New Medications        Instructions Start Date   aspirin 81 MG chewable tablet   81 mg, Oral, Daily   Start Date: December 1, 2023     magnesium oxide 400 MG tablet  Commonly known as: MAG-OX   400 mg, Oral, Daily             Continue These Medications        Instructions Start Date   amLODIPine 10 MG tablet  Commonly known as: NORVASC   10 mg, Oral, Daily               No Known Allergies      Discharge Disposition:  Home or Self Care    Diet:  Hospital:  Diet Order   Procedures    Diet: Cardiac Diets; Healthy Heart (2-3 Na+); Texture: Regular Texture (IDDSI 7); Fluid Consistency: Thin (IDDSI 0)          Activity: As tolerated      Restrictions or Other Recommendations:         CODE STATUS:    Code Status and Medical Interventions:   Ordered at: 11/29/23 1610     Level Of Support Discussed With:    Patient     Code Status (Patient has no pulse and is not breathing):    CPR (Attempt to Resuscitate)     Medical Interventions (Patient has pulse or is breathing):    Full Support       No future appointments.    Mikel Horn MD  11/30/23      Time Spent on Discharge:  I spent  20  minutes on this discharge activity which included: face-to-face encounter with the patient, reviewing the data in the system, coordination of the care with the nursing staff as well as  consultants, documentation, and entering orders.

## 2023-11-30 NOTE — PLAN OF CARE
Goal Outcome Evaluation:  Plan of Care Reviewed With: patient        Progress: no change  Outcome Evaluation: PT eval completed. Patient appears near his functional baseline and is independent w/ mobility. He demonstrates good strength (mild residual LLE weakness s/p remote CVA), good standing balance, and was able to ambulate safely and independently w/out AD or assist. No further acute PT services indicated. Anticipate safe D/C home when medically appropriate.      Anticipated Discharge Disposition (PT): home

## 2023-11-30 NOTE — PROGRESS NOTES
[facility] Medicine Services  PROGRESS NOTE    Patient Name: Cornelio Up  : 1980  MRN: [id number]    Date of Admission: 2023  Primary Care Physician: Provider, No Known    Subjective   Subjective     CC: Follow-up left-sided weakness and numbness    HPI:No acute events overnight, patient states that he feels better but not completely back to baseline      Objective   Objective     Vital Signs:   Temp:  [97.8 °F (36.6 °C)-98.7 °F (37.1 °C)] 97.8 °F (36.6 °C)  Heart Rate:  [] 75  Resp:  [16] 16  BP: (128-163)/() 145/93     Physical Exam:  Constitutional: No acute distress, awake, alert  HENT: NCAT, mucous membranes moist  Respiratory: Clear to auscultation bilaterally, respiratory effort normal   Cardiovascular: RRR, no murmurs, rubs, or gallops  Gastrointestinal: Positive bowel sounds, soft, nontender, nondistended  Musculoskeletal: No bilateral ankle edema  Psychiatric: Appropriate affect, cooperative  Neurologic: Oriented x 3, strength symmetric in all extremities  Skin: No rashes      Results Reviewed:  LAB RESULTS:      Lab 23  0845   WBC 9.41   HEMOGLOBIN 14.2   HEMATOCRIT 42.0   PLATELETS 387   NEUTROS ABS 2.92   EOS ABS 3.48*   MCV 94.2   APTT 28.7         Lab 23  0530 23  0906   SODIUM  --   --  136   POTASSIUM  --  3.7 3.4*   CHLORIDE  --   --  100   CO2  --   --  28.0   ANION GAP  --   --  8.0   BUN  --   --  7   CREATININE  --   --  0.74*   EGFR  --   --  116.7   GLUCOSE  --   --  100*   CALCIUM  --   --  8.6   HEMOGLOBIN A1C 5.20  --   --          Lab 23  0906   TOTAL PROTEIN 7.4   ALBUMIN 4.0   GLOBULIN 3.4   ALT (SGPT) 13   AST (SGOT) 27   BILIRUBIN 0.3   ALK PHOS 64         Lab 23  0906   HSTROP T 8         Lab 23  0530   CHOLESTEROL 140   LDL CHOL 55   HDL CHOL 67*   TRIGLYCERIDES 98             Brief Urine Lab Results       None            Microbiology Results Abnormal       None            MRI  Brain Without Contrast    Result Date: 11/29/2023  MRI BRAIN WO CONTRAST Date of Exam: 11/29/2023 8:23 PM CST Indication: Stroke, follow up.  Comparison: None available. Technique:  Routine multiplanar/multisequence sequence images of the brain were obtained without contrast administration. Findings: There is no diffusion restriction to suggest acute infarct. There is no evidence of acute or chronic intracranial hemorrhage. No mass effect or midline shift. No abnormal extra-axial collections. Trace nonspecific periventricular white matter changes likely reflective of early microvascular ischemic disease. No mass effect. The basal ganglia, brainstem and cerebellum appear within normal limits. Midline structures are intact. No significant cortical abnormality is identified. Calvarial and superficial soft tissue signal is within normal limits. Orbits appear unremarkable. There is mild to moderate diffuse paranasal sinus mucosal thickening without air-fluid levels.     Impression: Impression: 1.No acute intracranial process no evidence of infarction. 2.Minimal nonspecific white matter change. Electronically Signed: Ronaldo Brower MD  11/29/2023 8:52 PM CST  Workstation ID: [id number]    Adult Transthoracic Echo Complete W/ Cont if Necessary Per Protocol (With Agitated Saline)    Result Date: 11/29/2023    Left ventricular systolic function is normal. Left ventricular ejection fraction appears to be 56 - 60%.   The left ventricular cavity is mildly dilated.   Saline test results are negative.   No significant valvular abnormalities.     CT Angiogram Head w AI Analysis of LVO    Result Date: 11/29/2023  CT CEREBRAL PERFUSION W WO CONTRAST, CT ANGIOGRAM NECK, CT ANGIOGRAM HEAD W AI ANALYSIS OF LVO Date of Exam: 11/29/2023 8:35 AM EST Indication: Neuro deficit, acute stroke suspected.  Comparison: Concurrent noncontrast head CT Technique: Axial CT images of the brain were obtained prior to and after the administration of 115  mL Isovue-370. Core blood volume, core blood flow, mean transit time, and Tmax images were obtained utilizing the Rapid software protocol. A limited CT angiogram of the head was also performed to measure the blood vessel density. CTA of the head and neck was performed after the uneventful intravenous administration of above contrast. Reconstructed coronal and sagittal images were also obtained. In addition, a 3-D volume rendered image was created for interpretation. Automated exposure control and iterative reconstruction methods were used. The radiation dose reduction device was turned on for each scan per the ALARA (As Low as Reasonably Achievable) protocol. Findings: CT cerebral perfusion: No core infarct or ischemic tissue at risk. There is mild patchy hypoperfusion in the watershed areas bilaterally, nonspecific. CTA HEAD: No abrupt cut off/large vessel occlusion, dissection, or aneurysm. There is relatively diffuse diminutive caliber of the intracranial arteries of both the anterior and posterior circulation. There is ill-defined moderate or severe short segment stenosis/indistinctness at the origin of the inferior division of the right M2 (series 10 image [id number]). Compared to the left side there appears to be decreased number of well-opacified distal right M2 branches; this is most apparent on the MIP images. The cerebral veins and dural venous sinuses are grossly patent. CTA NECK: No abrupt cut off/large vessel occlusion, flow-limiting stenosis, dissection, or aneurysm. No appreciable atherosclerosis. Extravascular findings: The upper lungs are grossly clear. Homogeneous attenuation of the thyroid gland. No pharyngeal or laryngeal mass lesion allowing for some motion artifact at the level of the larynx. No acute or suspicious bony findings. There is paranasal sinus disease.     Impression: Impression: No calculated core infarct or ischemic tissue at risk on CT perfusion. Diffuse diminutive caliber of the  intracranial arteries which could reflect diffuse vasospasm. There is indistinct moderate or severe short segment stenosis at the origin of the inferior division of the right M2. There is suggestion of asymmetric decrease in the number and conspicuity of distal right M2 branches compared to the left side, particularly on the MIP images. Paranasal sinus disease. Electronically Signed: Brayden Burdick MD  11/29/2023 9:43 AM EST  Workstation ID: [id number]    CT Angiogram Neck    Result Date: 11/29/2023  CT CEREBRAL PERFUSION W WO CONTRAST, CT ANGIOGRAM NECK, CT ANGIOGRAM HEAD W AI ANALYSIS OF LVO Date of Exam: 11/29/2023 8:35 AM EST Indication: Neuro deficit, acute stroke suspected.  Comparison: Concurrent noncontrast head CT Technique: Axial CT images of the brain were obtained prior to and after the administration of 115 mL Isovue-370. Core blood volume, core blood flow, mean transit time, and Tmax images were obtained utilizing the Rapid software protocol. A limited CT angiogram of the head was also performed to measure the blood vessel density. CTA of the head and neck was performed after the uneventful intravenous administration of above contrast. Reconstructed coronal and sagittal images were also obtained. In addition, a 3-D volume rendered image was created for interpretation. Automated exposure control and iterative reconstruction methods were used. The radiation dose reduction device was turned on for each scan per the ALARA (As Low as Reasonably Achievable) protocol. Findings: CT cerebral perfusion: No core infarct or ischemic tissue at risk. There is mild patchy hypoperfusion in the watershed areas bilaterally, nonspecific. CTA HEAD: No abrupt cut off/large vessel occlusion, dissection, or aneurysm. There is relatively diffuse diminutive caliber of the intracranial arteries of both the anterior and posterior circulation. There is ill-defined moderate or severe short segment stenosis/indistinctness at the  origin of the inferior division of the right M2 (series 10 image [id number]). Compared to the left side there appears to be decreased number of well-opacified distal right M2 branches; this is most apparent on the MIP images. The cerebral veins and dural venous sinuses are grossly patent. CTA NECK: No abrupt cut off/large vessel occlusion, flow-limiting stenosis, dissection, or aneurysm. No appreciable atherosclerosis. Extravascular findings: The upper lungs are grossly clear. Homogeneous attenuation of the thyroid gland. No pharyngeal or laryngeal mass lesion allowing for some motion artifact at the level of the larynx. No acute or suspicious bony findings. There is paranasal sinus disease.     Impression: Impression: No calculated core infarct or ischemic tissue at risk on CT perfusion. Diffuse diminutive caliber of the intracranial arteries which could reflect diffuse vasospasm. There is indistinct moderate or severe short segment stenosis at the origin of the inferior division of the right M2. There is suggestion of asymmetric decrease in the number and conspicuity of distal right M2 branches compared to the left side, particularly on the MIP images. Paranasal sinus disease. Electronically Signed: Brayden Burdick MD  11/29/2023 9:43 AM EST  Workstation ID: [id number]    CT CEREBRAL PERFUSION WITH & WITHOUT CONTRAST    Result Date: 11/29/2023  CT CEREBRAL PERFUSION W WO CONTRAST, CT ANGIOGRAM NECK, CT ANGIOGRAM HEAD W AI ANALYSIS OF LVO Date of Exam: 11/29/2023 8:35 AM EST Indication: Neuro deficit, acute stroke suspected.  Comparison: Concurrent noncontrast head CT Technique: Axial CT images of the brain were obtained prior to and after the administration of 115 mL Isovue-370. Core blood volume, core blood flow, mean transit time, and Tmax images were obtained utilizing the Rapid software protocol. A limited CT angiogram of the head was also performed to measure the blood vessel density. CTA of the head and neck was  performed after the uneventful intravenous administration of above contrast. Reconstructed coronal and sagittal images were also obtained. In addition, a 3-D volume rendered image was created for interpretation. Automated exposure control and iterative reconstruction methods were used. The radiation dose reduction device was turned on for each scan per the ALARA (As Low as Reasonably Achievable) protocol. Findings: CT cerebral perfusion: No core infarct or ischemic tissue at risk. There is mild patchy hypoperfusion in the watershed areas bilaterally, nonspecific. CTA HEAD: No abrupt cut off/large vessel occlusion, dissection, or aneurysm. There is relatively diffuse diminutive caliber of the intracranial arteries of both the anterior and posterior circulation. There is ill-defined moderate or severe short segment stenosis/indistinctness at the origin of the inferior division of the right M2 (series 10 image [phone]). Compared to the left side there appears to be decreased number of well-opacified distal right M2 branches; this is most apparent on the MIP images. The cerebral veins and dural venous sinuses are grossly patent. CTA NECK: No abrupt cut off/large vessel occlusion, flow-limiting stenosis, dissection, or aneurysm. No appreciable atherosclerosis. Extravascular findings: The upper lungs are grossly clear. Homogeneous attenuation of the thyroid gland. No pharyngeal or laryngeal mass lesion allowing for some motion artifact at the level of the larynx. No acute or suspicious bony findings. There is paranasal sinus disease.     Impression: Impression: No calculated core infarct or ischemic tissue at risk on CT perfusion. Diffuse diminutive caliber of the intracranial arteries which could reflect diffuse vasospasm. There is indistinct moderate or severe short segment stenosis at the origin of the inferior division of the right M2. There is suggestion of asymmetric decrease in the number and conspicuity of distal  right M2 branches compared to the left side, particularly on the MIP images. Paranasal sinus disease. Electronically Signed: Brayden Burdick MD  11/29/2023 9:43 AM EST  Workstation ID: [id number]    XR Chest 1 View    Result Date: 11/29/2023  XR CHEST 1 VW Date of Exam: 11/29/2023 9:27 AM EST Indication: Acute Stroke Protocol (onset < 12 hrs) Comparison: None available. Findings: Lung fields are well inflated. There are no acute lung infiltrates or effusions. Heart and pulmonary vessels appear within normal limits. There is a mild S-shaped thoracic scoliosis.     Impression: Impression: No acute process. Electronically Signed: Preeti Rush MD  11/29/2023 9:32 AM EST  Workstation ID: [id number]    CT Head Without Contrast Stroke Protocol    Result Date: 11/29/2023  CT HEAD WO CONTRAST STROKE PROTOCOL Date of Exam: 11/29/2023 8:32 AM EST Indication: Neuro deficit, acute, stroke suspected Neuro deficit, acute stroke suspected. Comparison: None available. Technique: Axial CT images were obtained of the head without contrast administration.  Reconstructed coronal images were also obtained. Automated exposure control and iterative construction methods were used. Scan Time: 8:31 a.m. Results discussed with Dr. Del Angel at 8:35 a.m., 11/29/2023. Findings: The calvarium appears intact. There is extensive mucosal thickening of the ethmoid sinuses, left sphenoid sinus, and maxillary sinuses. There is milder frontal sinus disease. Mastoid air cells appear clear. There are small symmetric normal variant dilated perivascular spaces in both medial temporal lobes. There is no evidence of edema/infarct, intracranial hemorrhage, mass or mass effect, hydrocephalus, or abnormal extra-axial collection. No unusual focal or generalized encephalomalacia is seen. No significant white matter changes are appreciated.     Impression: Impression: No evidence of acute intracranial disease. Electronically Signed: Vincent Moya MD  11/29/2023 8:50 AM  EST  Workstation ID: [id number]     Results for orders placed during the hospital encounter of 11/29/23    Adult Transthoracic Echo Complete W/ Cont if Necessary Per Protocol (With Agitated Saline)    Interpretation Summary    Left ventricular systolic function is normal. Left ventricular ejection fraction appears to be 56 - 60%.    The left ventricular cavity is mildly dilated.    Saline test results are negative.    No significant valvular abnormalities.      Current medications:  Scheduled Meds:aspirin, 81 mg, Oral, Daily  atorvastatin, 80 mg, Oral, Nightly  folic acid, 1 mg, Oral, Daily  multivitamin with minerals, 1 tablet, Oral, Daily  senna-docusate sodium, 2 tablet, Oral, BID  sodium chloride, 10 mL, Intravenous, Q12H  sodium chloride, 10 mL, Intravenous, Q12H  thiamine (B-1) IV, 200 mg, Intravenous, Q8H   Followed by  [START ON 12/5/2023] thiamine, 100 mg, Oral, Daily      Continuous Infusions:   PRN Meds:.  acetaminophen **OR** acetaminophen **OR** acetaminophen    senna-docusate sodium **AND** polyethylene glycol **AND** bisacodyl **AND** bisacodyl    Calcium Replacement - Follow Nurse / BPA Driven Protocol    LORazepam **OR** midazolam **OR** LORazepam **OR** midazolam **OR** midazolam **OR** midazolam    Magnesium Standard Dose Replacement - Follow Nurse / BPA Driven Protocol    ondansetron **OR** ondansetron    Phosphorus Replacement - Follow Nurse / BPA Driven Protocol    Potassium Replacement - Follow Nurse / BPA Driven Protocol    sodium chloride    sodium chloride    sodium chloride    sodium chloride    sodium chloride    Assessment & Plan   Assessment & Plan     Active Hospital Problems    Diagnosis  POA    **Arm numbness left [R20.0]  Yes    Cocaine use [F14.90]  Unknown    History of stroke [Z86.73]  Not Applicable    Alcohol use [Z78.9]  Unknown      Resolved Hospital Problems   No resolved problems to display.        Brief Hospital Course to date:  Cornelio Up is a 43 y.o. male history of CVA  2013 and 2023 with residual mild left leg weakness and numbness, hypertension, tobacco and EtOH abuse, polysubstance abuse who presented to the ED with left-sided weakness and numbness    Left-sided weakness and numbness  History of prior CVA with residual left leg weakness and numbness  Rule out acute CVA  -CT head is negative, CT perfusion concerning for diffuse vasospasm likely secondary to cocaine  -MRI brain is negative for any acute infarct  -Stroke neurology following, continue workup per protocol  -Continue aspirin and statin  -PT/OT to evaluate    Hypokalemia  -Continue to replete per protocol    EtOH abuse  Polysubstance abuse  -UDS positive for cocaine  -Continue CIWA with as needed (  -Folic acid, thiamine and multivitamins  -chemical dependency consult in place    Expected Discharge Location and Transportation: Home  Expected Discharge   Expected Discharge Date: 11/30/2023; Expected Discharge Time:      DVT prophylaxis:  Mechanical DVT prophylaxis orders are present.     AM-PAC 6 Clicks Score (PT): 22 (11/29/23 2045)    CODE STATUS:   Code Status and Medical Interventions:   Ordered at: 11/29/23 1610     Level Of Support Discussed With:    Patient     Code Status (Patient has no pulse and is not breathing):    CPR (Attempt to Resuscitate)     Medical Interventions (Patient has pulse or is breathing):    Full Support       Mikel Horn MD  11/30/23

## 2023-11-30 NOTE — THERAPY DISCHARGE NOTE
Patient Name: Cornelio Up  : 1980    MRN: [id number]                              Today's Date: 2023       Admit Date: 2023    Visit Dx:     ICD-10-CM ICD-9-CM   1. Cerebrovascular accident (CVA), unspecified mechanism  I63.9 434.91   2. Arm paresthesia, left  R20.2 782.0     Patient Active Problem List   Diagnosis    Arm numbness left    Cocaine use    History of stroke    Alcohol use     Past Medical History:   Diagnosis Date    Hypertension     Stroke      History reviewed. No pertinent surgical history.   General Information       Row Name 23 1029          Physical Therapy Time and Intention    Document Type evaluation;discharge evaluation/summary  -MB     Mode of Treatment physical therapy  -MB       Row Name 23 1029          General Information    Patient Profile Reviewed yes  -MB     Prior Level of Function independent:;ADL's;bed mobility;transfer;gait;work;driving  PTA pt. was independent w/ community ambulation w/out AD, works f/t at [employer].  -MB     Existing Precautions/Restrictions other (see comments)  Remote CVA w/ mild residual L LE weakness  -MB     Barriers to Rehab medically complex  -MB       Row Name 23 1029          Living Environment    People in Home friend(s)  -MB       Row Name 23 1029          Home Main Entrance    Number of Stairs, Main Entrance none  -MB       Row Name 23 1029          Stairs Within Home, Primary    Number of Stairs, Within Home, Primary none  -MB       Row Name 23 1029          Cognition    Orientation Status (Cognition) oriented x 4  -MB       Row Name 23 1029          Safety Issues, Functional Mobility    Impairments Affecting Function (Mobility) strength  -MB     Comment, Safety Issues/Impairments (Mobility) very mild residual LLE weakness  -MB               User Key  (r) = Recorded By, (t) = Taken By, (c) = Cosigned By      Initials Name Provider Type    Denise Apple, PT Physical Therapist                    Mobility       Row Name 11/30/23 1404          Bed Mobility    Bed Mobility sit-supine  -MB     Sit-Supine [place] (Bed Mobility) independent  -MB     Comment, (Bed Mobility) Pt. received sitting UIC; returned to supine w/out difficulty or need for assist.  -MB       Row Name 11/30/23 1404          Transfers    Comment, (Transfers) Pt. demo safe and appropriate transfer technique w/ no LOB or instability.  -MB       Row Name 11/30/23 1404          Sit-Stand Transfer    Sit-Stand [place] (Transfers) independent  -[facility] Name 11/30/23 1404          Gait/Stairs (Locomotion)    [place] Level (Gait) independent  -MB     Distance in Feet (Gait) 50  -MB     Comment, (Gait/Stairs) Gait WNL; no LOB or instability  -MB               User Key  (r) = Recorded By, (t) = Taken By, (c) = Cosigned By      Initials Name Provider Type    Denise Apple, PT Physical Therapist                   Obj/Interventions       [facility] Name 11/30/23 1407          Range of Motion Comprehensive    General Range of Motion no range of motion deficits identified  -MB       Row Name 11/30/23 1407          Strength Comprehensive (MMT)    General Manual Muscle Testing (MMT) Assessment lower extremity strength deficits identified  -MB     Comment, General Manual Muscle Testing (MMT) Assessment RLE 5/5, L hip flex 5/5, L knee ext 4/5, L ankle DF/PF 4/5  -[facility] Name 11/30/23 1407          Balance    Balance Assessment sitting static balance;standing static balance;standing dynamic balance  -MB     Static Sitting Balance independent  -MB     Dynamic Sitting Balance independent  -MB     Position, Sitting Balance unsupported;sitting in chair;sitting edge of bed  -MB     Static Standing Balance independent  -MB     Dynamic Standing Balance independent  -MB     Position/Device Used, Standing Balance unsupported  -MB     Balance Interventions standing;sit to stand;weight shifting activity;dynamic reaching  -MB        Row Name 11/30/23 1407          Sensory Assessment (Somatosensory)    Sensory Assessment (Somatosensory) LE sensation intact;UE sensation intact  -MB               User Key  (r) = Recorded By, (t) = Taken By, (c) = Cosigned By      Initials Name Provider Type    Denise Apple, PT Physical Therapist                   Goals/Plan    No documentation.                  Clinical Impression       [facility] Name 11/30/23 1408          Pain    Pretreatment Pain Rating 0/10 - no pain  -MB     Posttreatment Pain Rating 0/10 - no pain  -MB       Row Name 11/30/23 1408          Plan of Care Review    Plan of Care Reviewed With patient  -MB     Progress no change  -MB     Outcome Evaluation PT eval completed. Patient appears near his functional baseline and is independent w/ mobility. He demonstrates good strength (mild residual LLE weakness s/p remote CVA), good standing balance, and was able to ambulate safely and independently w/out AD or assist. No further acute PT services indicated. Anticipate safe D/C home when medically appropriate.  -MB       Row Name 11/30/23 1408          Therapy Assessment/Plan (PT)    Criteria for Skilled Interventions Met (PT) no;no problems identified which require skilled intervention  -MB     Therapy Frequency (PT) evaluation only  -[facility] Name 11/30/23 1408          Vital Signs    Pre Systolic BP Rehab 154  -MB     Pre Treatment Diastolic BP 99  -MB     Post Systolic BP Rehab 142  -MB     Post Treatment Diastolic BP 92  -MB     Pre SpO2 (%) 99  -MB     O2 Delivery Pre Treatment room air  -MB     O2 Delivery Intra Treatment room air  -MB     Post SpO2 (%) 99  -MB     O2 Delivery Post Treatment room air  -MB     Pre Patient Position Sitting  -MB     Intra Patient Position Standing  -MB     Post Patient Position Supine  -MB       Row Name 11/30/23 1406          Positioning and Restraints    Pre-Treatment Position sitting in chair/recliner  -MB     Post Treatment Position bed  -MB     In  Bed notified nsg;supine;call light within reach;encouraged to call for assist;exit alarm on  -MB               User Key  (r) = Recorded By, (t) = Taken By, (c) = Cosigned By      Initials Name Provider Type    Denise Apple, PT Physical Therapist                   Outcome Measures       Row Name 11/30/23 1413          How much help from another person do you currently need...    Turning from your back to your side while in flat bed without using bedrails? 4  -MB     Moving from lying on back to sitting on the side of a flat bed without bedrails? 4  -MB     Moving to and from a bed to a chair (including a wheelchair)? 4  -MB     Standing up from a chair using your arms (e.g., wheelchair, bedside chair)? 4  -MB     Climbing 3-5 steps with a railing? 4  -MB     To walk in hospital room? 4  -MB     AM-PAC 6 Clicks Score (PT) 24  -MB     Highest Level of Mobility Goal 8 --> Walked 250 feet or more  -MB       Row Name 11/30/23 1413 11/30/23 0829       Modified [place] Scale    Modified [place] Scale 1 - No significant disability despite symptoms.  Able to carry out all usual duties and activities.  -MB 1 - No significant disability despite symptoms.  Able to carry out all usual duties and activities.  -      Row Name 11/30/23 1413 11/30/23 0829       Functional Assessment    Outcome Measure Options AM-PAC 6 Clicks Basic Mobility (PT)  -MB AM-PAC 6 Clicks Daily Activity (OT);Modified [place]  -CS              User Key  (r) = Recorded By, (t) = Taken By, (c) = Cosigned By      Initials Name Provider Type    Denise Apple, PT Physical Therapist    CS Gigi Fu, OT Occupational Therapist                  Physical Therapy Education       Title: PT OT SLP Therapies (In Progress)       Topic: Physical Therapy (Done)       Point: Mobility training (Done)       Learning Progress Summary             Patient Acceptance, E,D, VU by MB at 11/30/2023 1414                         Point: Home exercise program  (Done)       Learning Progress Summary             Patient Acceptance, E,D, VU by MB at 11/30/2023 1414                         Point: Body mechanics (Done)       Learning Progress Summary             Patient Acceptance, E,D, VU by MB at 11/30/2023 1414                         Point: Precautions (Done)       Learning Progress Summary             Patient Acceptance, E,D, VU by MB at 11/30/2023 1414                                         User Key       Initials Effective Dates Name Provider Type Discipline    MB 06/16/21 -  Denise Campa, PT Physical Therapist PT                  PT Recommendation and Plan     Plan of Care Reviewed With: patient  Progress: no change  Outcome Evaluation: PT eval completed. Patient appears near his functional baseline and is independent w/ mobility. He demonstrates good strength (mild residual LLE weakness s/p remote CVA), good standing balance, and was able to ambulate safely and independently w/out AD or assist. No further acute PT services indicated. Anticipate safe D/C home when medically appropriate.     Time Calculation:   PT Evaluation Complexity  History, PT Evaluation Complexity: 1-2 personal factors and/or comorbidities  Examination of Body Systems (PT Eval Complexity): total of 3 or more elements  Clinical Presentation (PT Evaluation Complexity): evolving  Clinical Decision Making (PT Evaluation Complexity): low complexity  Overall Complexity (PT Evaluation Complexity): low complexity     PT Charges       Row Name 11/30/23 1415             Time Calculation    Start Time 1029  -MB      PT Received On 11/30/23  -MB         Untimed Charges    PT Eval/Re-eval Minutes 46  -MB         Total Minutes    Untimed Charges Total Minutes 46  -MB       Total Minutes 46  -MB                User Key  (r) = Recorded By, (t) = Taken By, (c) = Cosigned By      Initials Name Provider Type    Denise Apple, PT Physical Therapist                  Therapy Charges for Today        Code Description Service Date Service Provider Modifiers Qty    28421639369 HC PT EVAL LOW COMPLEXITY 4 11/30/2023 Denise Campa, PT GP 1            PT G-Codes  Outcome Measure Options: AM-PAC 6 Clicks Basic Mobility (PT)  AM-PAC 6 Clicks Score (PT): 24  AM-PAC 6 Clicks Score (OT): 24  Modified Den[place] Scale: 1 - No significant disability despite symptoms.  Able to carry out all usual duties and activities.    PT Discharge Summary  Anticipated Discharge Disposition (PT): home    Denise Campa, PT  11/30/2023

## 2023-11-30 NOTE — THERAPY DISCHARGE NOTE
Acute Care - Occupational Therapy Discharge  [facility]    Patient Name: Cornelio Up  : 1980    MRN: [id number]                              Today's Date: 2023       Admit Date: 2023    Visit Dx:     ICD-10-CM ICD-9-CM   1. Cerebrovascular accident (CVA), unspecified mechanism  I63.9 434.91   2. Arm paresthesia, left  R20.2 782.0     Patient Active Problem List   Diagnosis    Arm numbness left    Cocaine use    History of stroke    Alcohol use     Past Medical History:   Diagnosis Date    Hypertension     Stroke      History reviewed. No pertinent surgical history.   General Information       Row Name 23          OT Time and Intention    Document Type discharge evaluation/summary  -CS     Mode of Treatment occupational therapy  -CS       Row Name 23          General Information    Patient Profile Reviewed yes  -CS     Prior Level of Function independent:;all household mobility;ADL's;driving;work  Pt works 1st shift at [employer], lives with roommates/friends. No AD/DME reported at baseline for ADL completion or related mobility.  -CS     Existing Precautions/Restrictions other (see comments)  Remote CVA w/ mild residual L LE weakness  -CS     Barriers to Rehab medically complex  -CS       Row Name 23          Living Environment    People in Home friend(s)  -CS       Row Name 23          Home Main Entrance    Number of Stairs, Main Entrance none  -CS       Row Name 23          Stairs Within Home, Primary    Number of Stairs, Within Home, Primary none  -CS       Row Name 23          Cognition    Orientation Status (Cognition) oriented x 4  -CS       Row Name 23          Safety Issues, Functional Mobility    Impairments Affecting Function (Mobility) strength  -CS     Comment, Safety Issues/Impairments (Mobility) residual/mild L LE weakness  -CS               User Key  (r) = Recorded By, (t) = Taken By, (c) = Cosigned By       Initials Name Provider Type    CS Gigi Fu, OT Occupational Therapist                   Mobility/ADL's       Row Name 11/30/23 0825          Bed Mobility    Bed Mobility supine-sit;scooting/bridging  -     Scooting/Bridging [place] (Bed Mobility) independent  -CS     Supine-Sit [place] (Bed Mobility) modified independence  -CS     Assistive Device (Bed Mobility) head of bed elevated  -CS     Comment, (Bed Mobility) appropriate sequencing intact, no dizziness reported upon sitting  -       Row Name 11/30/23 0825          Transfers    Transfers sit-stand transfer;bed-chair transfer  -CS     Comment, (Transfers) no dizziness reported upon standing, no LOB during distance to recliner  -       Row Name 11/30/23 0825          Bed-Chair Transfer    Bed-Chair [place] (Transfers) supervision  -       Row Name 11/30/23 0825          Sit-Stand Transfer    Sit-Stand [place] (Transfers) independent  -       Row Name 11/30/23 0825          Functional Mobility    Functional Mobility- Comment defer to PT for specifics. No LOB or AD for in-room ADL related distances  -       Row Name 11/30/23 0825          Activities of Daily Living    BADL Assessment/Intervention lower body dressing;grooming;feeding;toileting  -       Row Name 11/30/23 0825          Lower Body Dressing Assessment/Training    [place] Level (Lower Body Dressing) don;socks;independent  -CS     Position (Lower Body Dressing) edge of bed sitting  -       Row Name 11/30/23 0825          Grooming Assessment/Training    [place] Level (Grooming) grooming skills;independent  -       Row Name 11/30/23 0825          Self-Feeding Assessment/Training    [place] Level (Feeding) liquids to mouth;prepare tray/open items;scoop food and bring to mouth;independent  -     Position (Self-Feeding) supported sitting  -       Row Name 11/30/23 0825          Toileting Assessment/Training    [place] Level (Toileting)  adjust/manage clothing;perform perineal hygiene;independent  -CS     Assistive Devices (Toileting) urinal  -CS               User Key  (r) = Recorded By, (t) = Taken By, (c) = Cosigned By      Initials Name Provider Type    CS Gigi Fu OT Occupational Therapist                   Obj/Interventions       Row Name 11/30/23 0827          Sensory Assessment (Somatosensory)    Sensory Assessment (Somatosensory) bilateral UE  -CS     Bilateral UE Sensory Assessment general sensation;light touch awareness;light touch localization;proprioception;intact  -CS       Row Name 11/30/23 0827          Vision Assessment/Intervention    Visual Impairment/Limitations WFL  -CS       Row Name 11/30/23 0827          Strength Comprehensive (MMT)    General Manual Muscle Testing (MMT) Assessment no strength deficits identified  -CS     Comment, General Manual Muscle Testing (MMT) Assessment BUE grossly 5/5, symmetrical  -CS       Row Name 11/30/23 0827          Motor Skills    Motor Skills coordination;functional endurance  -CS     Coordination bilateral;upper extremity;finger to nose;WFL  -CS     Functional Endurance O2 sats stable on RA  -CS       Row Name 11/30/23 0827          Balance    Balance Assessment sitting static balance;sitting dynamic balance;standing static balance;standing dynamic balance  -CS     Static Sitting Balance independent  -CS     Dynamic Sitting Balance independent  -CS     Position, Sitting Balance unsupported;sitting edge of bed  -CS     Static Standing Balance independent  -CS     Dynamic Standing Balance independent  -CS     Position/Device Used, Standing Balance unsupported  -CS     Balance Interventions sitting;sit to stand;standing;occupation based/functional task  -CS     Comment, Balance no LOB during ADL performance  -CS               User Key  (r) = Recorded By, (t) = Taken By, (c) = Cosigned By      Initials Name Provider Type    CS Gigi Fu OT Occupational Therapist                    Goals/Plan    No documentation.                  Clinical Impression       Row Name 11/30/23 0828          Pain Assessment    Pretreatment Pain Rating 0/10 - no pain  -CS     Posttreatment Pain Rating 0/10 - no pain  -CS     Pain Intervention(s) Repositioned;Ambulation/increased activity  -CS       Row Name 11/30/23 0828          Plan of Care Review    Plan of Care Reviewed With patient  -CS     Progress no change  -CS     Outcome Evaluation Pt presents at baseline for ADL completion and related transfers with symmetrical BUE strength and coordination/sensation intact. OT signing off, please reconsult if needed. Rec d/c to home when medically appropriate.  -CS       Row Name 11/30/23 0828          Therapy Assessment/Plan (OT)    Criteria for Skilled Therapeutic Interventions Met (OT) no  -CS     Therapy Frequency (OT) evaluation only  -CS       Row Name 11/30/23 0828          Therapy Plan Review/Discharge Plan (OT)    Anticipated Discharge Disposition (OT) home  -CS       Row Name 11/30/23 0828          Vital Signs    Pre Systolic BP Rehab 145  -CS     Pre Treatment Diastolic BP 93  -CS     Post Systolic BP Rehab 154  -CS     Post Treatment Diastolic BP 99  -CS     Posttreatment Heart Rate (beats/min) 86  -CS     Pre SpO2 (%) 99  -CS     O2 Delivery Pre Treatment room air  -CS     O2 Delivery Intra Treatment room air  -CS     Post SpO2 (%) 100  -CS     O2 Delivery Post Treatment room air  -CS     Pre Patient Position Supine  -CS     Intra Patient Position Standing  -CS     Post Patient Position Sitting  -CS       Row Name 11/30/23 0828          Positioning and Restraints    Pre-Treatment Position in bed  -CS     Post Treatment Position chair  -CS     In Chair notified nsg;reclined;sitting;call light within reach;encouraged to call for assist;legs elevated;exit alarm on  -CS               User Key  (r) = Recorded By, (t) = Taken By, (c) = Cosigned By      Initials Name Provider Type    CS Gigi Fu, OT  Occupational Therapist                   Outcome Measures       Row Name 11/30/23 0829          How much help from another is currently needed...    Putting on and taking off regular lower body clothing? 4  -CS     Bathing (including washing, rinsing, and drying) 4  -CS     Toileting (which includes using toilet bed pan or urinal) 4  -CS     Putting on and taking off regular upper body clothing 4  -CS     Taking care of personal grooming (such as brushing teeth) 4  -CS     Eating meals 4  -CS     AM-PAC 6 Clicks Score (OT) 24  -CS       Row Name 11/29/23 2045          How much help from another person do you currently need...    Turning from your back to your side while in flat bed without using bedrails? 4  -SK     Moving from lying on back to sitting on the side of a flat bed without bedrails? 4  -SK     Moving to and from a bed to a chair (including a wheelchair)? 4  -SK     Standing up from a chair using your arms (e.g., wheelchair, bedside chair)? 4  -SK     Climbing 3-5 steps with a railing? 3  -SK     To walk in hospital room? 3  -SK     AM-PAC 6 Clicks Score (PT) 22  -SK     Highest Level of Mobility Goal 7 --> Walk 25 feet or more  -SK       Row Name 11/30/23 0829          Modified [place] Scale    Modified [place] Scale 1 - No significant disability despite symptoms.  Able to carry out all usual duties and activities.  -       Row Name 11/30/23 0829          Functional Assessment    Outcome Measure Options AM-PAC 6 Clicks Daily Activity (OT);Modified [place]  -CS               User Key  (r) = Recorded By, (t) = Taken By, (c) = Cosigned By      Initials Name Provider Type    Fadia Soto, RN Registered Nurse    Gigi Vieyra OT Occupational Therapist                  Occupational Therapy Education       Title: PT OT SLP Therapies (In Progress)       Topic: Occupational Therapy (In Progress)       Point: ADL training (Not Started)       Description:   Instruct learner(s) on proper safety  adaptation and remediation techniques during self care or transfers.   Instruct in proper use of assistive devices.                  Learner Progress:  Not documented in this visit.              Point: Home exercise program (Not Started)       Description:   Instruct learner(s) on appropriate technique for monitoring, assisting and/or progressing therapeutic exercises/activities.                  Learner Progress:  Not documented in this visit.              Point: Precautions (Done)       Description:   Instruct learner(s) on prescribed precautions during self-care and functional transfers.                  Learning Progress Summary             Patient Acceptance, E,D, VU,DU by JACQUELYN at 11/30/2023 0830    Comment: in-room safety awareness                         Point: Body mechanics (Not Started)       Description:   Instruct learner(s) on proper positioning and spine alignment during self-care, functional mobility activities and/or exercises.                  Learner Progress:  Not documented in this visit.                              User Key       Initials Effective Dates Name Provider Type Discipline    JACQUELYN 06/16/21 -  Gigi Fu OT Occupational Therapist OT                  OT Recommendation and Plan  Therapy Frequency (OT): evaluation only  Plan of Care Review  Plan of Care Reviewed With: patient  Progress: no change  Outcome Evaluation: Pt presents at baseline for ADL completion and related transfers with symmetrical BUE strength and coordination/sensation intact. OT signing off, please reconsult if needed. Rec d/c to home when medically appropriate.  Plan of Care Reviewed With: patient  Outcome Evaluation: Pt presents at baseline for ADL completion and related transfers with symmetrical BUE strength and coordination/sensation intact. OT signing off, please reconsult if needed. Rec d/c to home when medically appropriate.     Time Calculation:   Evaluation Complexity (OT)  Review Occupational  Profile/Medical/Therapy History Complexity: expanded/moderate complexity  Assessment, Occupational Performance/Identification of Deficit Complexity: 1-3 performance deficits  Clinical Decision Making Complexity (OT): problem focused assessment/low complexity  Overall Complexity of Evaluation (OT): low complexity     Time Calculation- OT       Row Name 11/30/23 0830             Time Calculation- OT    OT Start Time 0747  -CS      OT Received On 11/30/23  -CS         Untimed Charges    OT Eval/Re-eval Minutes 41  -CS         Total Minutes    Untimed Charges Total Minutes 41  -CS       Total Minutes 41  -CS                User Key  (r) = Recorded By, (t) = Taken By, (c) = Cosigned By      Initials Name Provider Type    CS Gigi Fu, OT Occupational Therapist                  Therapy Charges for Today       Code Description Service Date Service Provider Modifiers Qty    21990174140  OT EVAL LOW COMPLEXITY 3 11/30/2023 Gigi Fu OT GO 1               OT Discharge Summary  Anticipated Discharge Disposition (OT): home  Reason for Discharge: At baseline function, Independent  Discharge Destination: Home    Gigi Fu OT  11/30/2023

## 2023-11-30 NOTE — PLAN OF CARE
Goal Outcome Evaluation:  Plan of Care Reviewed With: patient        Progress: improving  Outcome Evaluation: Pt has appeared to have rested well this shift. NSR on monitor. CIWA score has been 1 due to mild headache. AOx4, RA. No complaints or concerns this shift.

## 2023-11-30 NOTE — CONSULTS
Diabetes Education    Patient Name:  Cornelio Up  YOB: 1980  MRN: [id number]  Admit Date:  11/29/2023        Order criteria not met for diabetes education consult. Current A1c is 5.2, noted during chart review. Pt has no history of DM and no home meds for DM. Thank you.      Electronically signed by:  Canidce Au RN  11/30/23 08:17 EST

## 2023-11-30 NOTE — NURSING NOTE
Pt AxO4, Vss, and on room air. Discharge teaching completed with patient, no questions at this time. Patient said ride should be here to pick him up in 20-30 minutes. Telebox cleaned and returned back to [facility].

## 2023-11-30 NOTE — PLAN OF CARE
Goal Outcome Evaluation:  Plan of Care Reviewed With: patient         SLP evaluation completed. Will  f/u for further cognitive-communication assessment/tx, as indicated.  Please see note for further details and recommendations.           Anticipated Discharge Disposition (SLP): home with OP services (if acute deficits persist)

## 2023-11-30 NOTE — PROGRESS NOTES
Stroke Progress Note       Chief Complaint: Left-sided numbness    Subjective    Subjective     Subjective:  Left-sided numbness resolved    Review of Systems   Constitutional: No fatigue  HENT: Negative for nosebleeds and rhinorrhea.    Eyes: Negative for redness.   Respiratory: Negative for cough.    Gastrointestinal: Negative for anal bleeding.   Endocrine: Negative for polydipsia.   Genitourinary: Negative for enuresis and urgency.   Musculoskeletal: Negative for joint swelling.   Neurological: Negative for tremors.   Psychiatric/Behavioral: Negative for hallucinations.      Objective      Temp:  [97.8 °F (36.6 °C)-98.7 °F (37.1 °C)] 97.8 °F (36.6 °C)  Heart Rate:  [] 75  Resp:  [16] 16  BP: (128-163)/() 145/93            GEN: NAD, pleasant, cooperative  Eyes-show anicteric sclera, moist conjunctiva with no lid lag, no redness  Neck-trachea midline.  There is no thyromegaly.  ENMT-oropharynx clear with moist mucous membranes and good dentition.  Skin-no rash, lesions or ulcers.  Cardiovascular exam-no pedal edema, regular rate and rhythm.  CHEST: No signs of resp distress, on room air  Abdomen-no abdominal distention, nontender.  Psychiatric exam-alert oriented x3 with intact judgment and insight      NEURO    MENTAL STATUS: AAOx3, memory intact, fund of knowledge appropriate    LANG/SPEECH: Naming and repetition intact, fluent, follows 3-step commands    CRANIAL NERVES:      II: Pupils equal and reactive, no RAPD, no VF deficits, fundus(not done)      III, IV, VI: EOM intact, no gaze preference or deviation, no nystagmus.      V: normal sensation in V1, V2, and V3 segments bilaterally      VII: no asymmetry, no nasolabial fold flattening      VIII: normal hearing to speech      IX, X: normal palatal elevation, no uvular deviation      XI: 5/5 head turn and 5/5 shoulder shrug bilaterally      XII: midline tongue protrusion    MOTOR:  Normal tone throughout  5/5 muscle power in Rt shoulder  abductors/adductors, elbow flexors/extensors, wrist flexors/extensors, finger abductors/adductors.  5/5 in Rt hip flexors/extensors, knee flexors/extensors, ankle dorsiflexors and plantar flexors.    5/5 muscle power in Lt shoulder abductors/adductors, elbow flexors/extensors, wrist flexors/extensors, finger abductors/adductors.  5/5 in Lt hip flexors/extensors, knee flexors/extensors, ankle dorsiflexors and plantea flexors.    REFLEXES:  no Haynes's, no clonus    SENSORY:    Normal to light touch all throughout     COORDINATION: Normal finger to nose and heel to shin, no tremor, no dysmetria    STATION: Not assessed due to patient condition    GAIT: Not assessed due to patient condition     Results Review:    I reviewed the patient's new clinical results.    Lab Results (last 24 hours)       Procedure Component Value Units Date/Time    Hemoglobin A1c [[id number]]  (Normal) Collected: 11/30/23 0530    Specimen: Blood Updated: 11/30/23 0718     Hemoglobin A1C 5.20 %     Narrative:      Hemoglobin A1C Ranges:    Increased Risk for Diabetes  5.7% to 6.4%  Diabetes                     >= 6.5%  Diabetic Goal                < 7.0%    Lipid Panel [[id number]]  (Abnormal) Collected: 11/30/23 0530    Specimen: Blood Updated: 11/30/23 0700     Total Cholesterol 140 mg/dL      Triglycerides 98 mg/dL      HDL Cholesterol 67 mg/dL      LDL Cholesterol  55 mg/dL      VLDL Cholesterol 18 mg/dL      LDL/HDL Ratio 0.80    Narrative:      Cholesterol Reference Ranges  (U.S. Department of Health and Human Services ATP III Classifications)    Desirable          <200 mg/dL  Borderline High    200-239 mg/dL  High Risk          >240 mg/dL      Triglyceride Reference Ranges  (U.S. Department of Health and Human Services ATP III Classifications)    Normal           <150 mg/dL  Borderline High  150-199 mg/dL  High             200-499 mg/dL  Very High        >500 mg/dL    HDL Reference Ranges  (U.S. Department of Health and Human Services ATP  III Classifications)    Low     <40 mg/dl (major risk factor for CHD)  High    >60 mg/dl ('negative' risk factor for CHD)        LDL Reference Ranges  (U.S. Department of Health and Human Services ATP III Classifications)    Optimal          <100 mg/dL  Near Optimal     100-129 mg/dL  Borderline High  130-159 mg/dL  High             160-189 mg/dL  Very High        >189 mg/dL    POC Glucose Once [[id number]]  (Normal) Collected: 11/29/23 2334    Specimen: Blood Updated: 11/29/23 2336     Glucose 130 mg/dL     Potassium [[id number]]  (Normal) Collected: 11/29/23 2026    Specimen: Blood Updated: 11/29/23 2111     Potassium 3.7 mmol/L      Comment: Slight hemolysis detected by analyzer. Result may be falsely elevated.       POC Glucose Once [[id number]]  (Normal) Collected: 11/29/23 1754    Specimen: Blood Updated: 11/29/23 1756     Glucose 75 mg/dL     [place] Draw [[id number]] Collected: 11/29/23 0845    Specimen: Blood Updated: 11/29/23 1301    Narrative:      The following orders were created for panel order [place] Draw.  Procedure                               Abnormality         Status                     ---------                               -----------         ------                     Green Top (Gel)[[id number]]                                  Final result               Lavender Top[[id number]]                                     Final result               Gold Top - SST[[id number]]                                   Final result               Gray Top[[id number]]                                         Final result               Light Blue Top[[id number]]                                   Final result                 Please view results for these tests on the individual orders.    Gray Top [[id number]] Collected: 11/29/23 0845    Specimen: Blood Updated: 11/29/23 1301     Extra Tube Hold for add-ons.     Comment: Auto resulted.       Fentanyl, Urine - Urine, Clean Catch [[id number]]  (Normal) Collected: 11/29/23 0913     Specimen: Urine, Clean Catch Updated: 11/29/23 1054     Fentanyl, Urine Negative    Narrative:      Negative Threshold:      Fentanyl 5 ng/mL     The normal value for the drug tested is negative. This report includes final unconfirmed screening results to be used for medical treatment purposes only. Unconfirmed results must not be used for non-medical purposes such as employment or legal testing. Clinical consideration should be applied to any drug of abuse test, particularly when unconfirmed results are used.                 MRI Brain Without Contrast    Result Date: 11/29/2023  Impression: 1.No acute intracranial process no evidence of infarction. 2.Minimal nonspecific white matter change. Electronically Signed: Ronaldo Brower MD  11/29/2023 8:52 PM CST  Workstation ID: [id number]    CT Angiogram Head w AI Analysis of LVO    Result Date: 11/29/2023  Impression: No calculated core infarct or ischemic tissue at risk on CT perfusion. Diffuse diminutive caliber of the intracranial arteries which could reflect diffuse vasospasm. There is indistinct moderate or severe short segment stenosis at the origin of the inferior division of the right M2. There is suggestion of asymmetric decrease in the number and conspicuity of distal right M2 branches compared to the left side, particularly on the MIP images. Paranasal sinus disease. Electronically Signed: Brayden Burdick MD  11/29/2023 9:43 AM EST  Workstation ID: [id number]    CT Angiogram Neck    Result Date: 11/29/2023  Impression: No calculated core infarct or ischemic tissue at risk on CT perfusion. Diffuse diminutive caliber of the intracranial arteries which could reflect diffuse vasospasm. There is indistinct moderate or severe short segment stenosis at the origin of the inferior division of the right M2. There is suggestion of asymmetric decrease in the number and conspicuity of distal right M2 branches compared to the left side, particularly on the MIP images.  Paranasal sinus disease. Electronically Signed: Brayden Burdick MD  11/29/2023 9:43 AM EST  Workstation ID: [id number]    CT CEREBRAL PERFUSION WITH & WITHOUT CONTRAST    Result Date: 11/29/2023  Impression: No calculated core infarct or ischemic tissue at risk on CT perfusion. Diffuse diminutive caliber of the intracranial arteries which could reflect diffuse vasospasm. There is indistinct moderate or severe short segment stenosis at the origin of the inferior division of the right M2. There is suggestion of asymmetric decrease in the number and conspicuity of distal right M2 branches compared to the left side, particularly on the MIP images. Paranasal sinus disease. Electronically Signed: Brayden Burdick MD  11/29/2023 9:43 AM EST  Workstation ID: [id number]    XR Chest 1 View    Result Date: 11/29/2023  Impression: No acute process. Electronically Signed: Preeti Rush MD  11/29/2023 9:32 AM EST  Workstation ID: [id number]    CT Head Without Contrast Stroke Protocol    Result Date: 11/29/2023  Impression: No evidence of acute intracranial disease. Electronically Signed: Vincent Moya MD  11/29/2023 8:50 AM EST  Workstation ID: [id number]   Results for orders placed during the hospital encounter of 11/29/23    Adult Transthoracic Echo Complete W/ Cont if Necessary Per Protocol (With Agitated Saline)    Interpretation Summary    Left ventricular systolic function is normal. Left ventricular ejection fraction appears to be 56 - 60%.    The left ventricular cavity is mildly dilated.    Saline test results are negative.    No significant valvular abnormalities.            Assessment/Plan     Assessment/Plan:  This 43-year-old -American male with a previous history of stroke, presents with worsening left-sided weakness.  Patient urine drug screen positive on cocaine.  I personally reviewed the CTAs which showed diffuse intracranial narrowing likely vasospasm in the context of his current cocaine use.  MRI brain did not  evidence any acute infarct.  Patient  is neurologically normal and at his baseline.      Transischemic attack, secondary to vasospasm from cocaine abuse.  Plan  -Continue aspirin 81 daily  -Continue magnesium 400 daily-  -counseled extensively on cessation of cocaine use, patient voiced understanding and willing to quit.  -Stroke clinic follow-up in 1 month.  - IF PT OT clears the patient, patient can be discharged from the stroke standpoint.        Stefan Bueno MD  11/30/23  10:22 EST

## 2023-11-30 NOTE — PLAN OF CARE
Goal Outcome Evaluation:  Plan of Care Reviewed With: patient        Progress: no change  Outcome Evaluation: Pt presents at baseline for ADL completion and related transfers with symmetrical BUE strength and coordination/sensation intact. OT signing off, please reconsult if needed. Rec d/c to home when medically appropriate.      Anticipated Discharge Disposition (OT): home

## 2023-11-30 NOTE — THERAPY EVALUATION
Acute Care - Speech Language Pathology Initial Evaluation  [facility]  Cognitive-Communication Evaluation     Patient Name: Cornelio Up  : 1980  MRN: [id number]  Today's Date: 2023               Admit Date: 2023     Visit Dx:    ICD-10-CM ICD-9-CM   1. Cerebrovascular accident (CVA), unspecified mechanism  I63.9 434.91   2. Arm paresthesia, left  R20.2 782.0   3. Cognitive communication deficit  R41.841 799.52     Patient Active Problem List   Diagnosis    Arm numbness left    Cocaine use    History of stroke    Alcohol use     Past Medical History:   Diagnosis Date    Hypertension     Stroke      History reviewed. No pertinent surgical history.    SLP Recommendation and Plan  SLP Diagnosis: mild, cognitive-linguistic disorder (23)  SLP Diagnosis Comments: Pt feels approaching baseline. (23 1500)        SLC Criteria for Skilled Therapy Interventions Met: yes (23)  Anticipated Discharge Disposition (SLP): home with OP services (if acute deficits persist) (23)        Therapy Frequency (SLP SLC): 5 days per week (23 1500)  Predicted Duration Therapy Intervention (Days): until discharge (23)      Plan of Care Reviewed With: patient (23 1557)      SLP EVALUATION (last 72 hours)       SLP SLC Evaluation       Row Name 23                   Communication Assessment/Intervention    Document Type evaluation  -AC        Subjective Information no complaints  -AC        Patient Observations alert;cooperative  -AC        Patient/Family/Caregiver Comments/Observations No family present.  -AC        Patient Effort good  -AC           General Information    Patient Profile Reviewed yes  -AC        Pertinent History Of Current Problem LUE numbness. Concern for TIA 2' vasospasm from cocaine use per chart. Hx CVA, alcohol use.  -AC        Precautions/Limitations, Vision WFL;for purposes of eval  -AC        Precautions/Limitations, Hearing  WFL;for purposes of eval  -AC        Patient Level of Education GED  -        Prior Level of Function-Communication WFL  reported some baseline attention/memory deficits  -AC        Plans/Goals Discussed with patient;agreed upon  -        Barriers to Rehab none identified  -        Patient's Goals for Discharge return to home;take care of myself at home;return to work  -AC           Pain    Additional Documentation Pain Scale: FACES Pre/Post-Treatment (Group)  -           Pain Scale: FACES Pre/Post-Treatment    Pain: FACES Scale, Pretreatment 0-->no hurt  -AC        Posttreatment Pain Rating 0-->no hurt  -AC           Comprehension Assessment/Intervention    Comprehension Assessment/Intervention Auditory Comprehension;Reading Comprehension  -           Auditory Comprehension Assessment/Intervention    Auditory Comprehension (Communication) WFL  -AC        Answers Questions (Communication) WFL;complex;yes/no;simple;wh questions  -AC        Able to Follow Commands (Communication) WFL;2-step  -AC        Narrative Discourse WFL;conversational level  -AC           Reading Comprehension Assessment/Intervention    Reading Comprehension (Communication) WFL  -AC        Paragraph Level WFL  -AC           Expression Assessment/Intervention    Expression Assessment/Intervention graphic expression;verbal expression  -AC           Verbal Expression Assessment/Intervention    Verbal Expression WFL  -AC        Automatic Speech (Communication) WFL;response to greeting  -AC        Responsive Naming WFL;simple  -AC        Confrontational Naming WFL;high frequency  -AC        Conversational Discourse/Fluency WFL  -AC           Graphic Expression Assessment/Intervention    Graphic Expression WFL;dominant hand  -AC        Sentence Formulation WFL;complex  -AC           Motor Speech Assessment/Intervention    Motor Speech Function WFL;unfamiliar listener  -AC        Conversational Speech (Communication) WFL;simple  -AC         Speech intelligibility 100%;in quiet environment;in connected speech;with unfamiliar listener  -AC           Cognitive Assessment Intervention- SLP    Cognitive Function (Cognition) mild impairment  -AC        Orientation Status (Cognition) WFL;person;place;time;situation  -AC        Memory (Cognitive) mild impairment;short-term;unrelated;delayed;other (see comments)  immediate: 5/5 words; 5-min delay: 3/5 words (5/5 w/ min cue)  -AC        Attention (Cognitive) mild impairment;sustained  -AC        Thought Organization (Cognitive) WFL;abstract convergent;drawing conclusions  -AC        Reasoning (Cognitive) WFL;analogies;mental flexibility  -AC        Problem Solving (Cognitive) WFL;temporal  -AC        Functional Math (Cognitive) WFL;money calculation  -AC           SLP Evaluation Clinical Impressions    SLP Diagnosis mild;cognitive-linguistic disorder  -AC        SLP Diagnosis Comments Pt feels approaching baseline.  -AC        Rehab Potential/Prognosis good  -        SLC Criteria for Skilled Therapy Interventions Met yes  -AC        Functional Impact restrictions in personal and social life  -AC           Recommendations    Therapy Frequency (SLP SLC) 5 days per week  -AC        Predicted Duration Therapy Intervention (Days) until discharge  -AC        Anticipated Discharge Disposition (SLP) home with OP services  if acute deficits persist  -                  User Key  (r) = Recorded By, (t) = Taken By, (c) = Cosigned By      Initials Name Effective Dates    AC Margarette Lopez MS CCC-SLP 02/03/23 -                        EDUCATION  The patient has been educated in the following areas:     Cognitive Impairment.           SLP GOALS       Row Name 11/30/23 1500             Patient will demonstrate functional cognitive-linguistic skills for return to discharge environment    [place] Independently  -      Time frame by discharge  -AC         Attention Goal 1 (SLP)    Improve Attention by Goal 1 (SLP)  complete sustained attention task;90%;independently (over 90% accuracy)  -AC      Time Frame (Attention Goal 1, SLP) short term goal (STG)  -AC         Memory Skills Goal 1 (SLP)    Improve Memory Skills Through Goal 1 (SLP) recalling unrelated word lists with an imposed delay;use memory strategies;90%;independently (over 90% accuracy)  -      Time Frame (Memory Skills Goal 1, SLP) short term goal (STG)  -AC                User Key  (r) = Recorded By, (t) = Taken By, (c) = Cosigned By      Initials Name Provider Type    Margarette Ya MS CCC-SLP Speech and Language Pathologist                            Time Calculation:      Time Calculation- SLP       Row Name 11/30/23 1558             Time Calculation- SLP    SLP Start Time 1500  -AC      SLP Received On 11/30/23  -AC         Untimed Charges    [id number] Eval Speech and Production w/ Language Minutes 56  -AC         Total Minutes    Untimed Charges Total Minutes 56  -AC       Total Minutes 56  -AC                User Key  (r) = Recorded By, (t) = Taken By, (c) = Cosigned By      Initials Name Provider Type    Margarette Ya MS CCC-SLP Speech and Language Pathologist                    Therapy Charges for Today       Code Description Service Date Service Provider Modifiers Qty    45496651361 HC ST EVAL SPEECH AND PROD W LANG  4 11/30/2023 Margarette Lopez MS CCC-SLP GN 1                       Margarette Lopez MS CCC-ERIN  11/30/2023

## 2023-12-05 LAB
QT INTERVAL: 346 MS
QTC INTERVAL: 454 MS

## 2024-12-23 ENCOUNTER — APPOINTMENT (OUTPATIENT)
Facility: HOSPITAL | Age: 44
End: 2024-12-23
Payer: COMMERCIAL

## 2024-12-23 ENCOUNTER — HOSPITAL ENCOUNTER (EMERGENCY)
Facility: HOSPITAL | Age: 44
Discharge: HOME | End: 2024-12-23
Attending: STUDENT IN AN ORGANIZED HEALTH CARE EDUCATION/TRAINING PROGRAM | Admitting: STUDENT IN AN ORGANIZED HEALTH CARE EDUCATION/TRAINING PROGRAM
Payer: COMMERCIAL

## 2024-12-23 VITALS
TEMPERATURE: 98.2 F | OXYGEN SATURATION: 99 % | DIASTOLIC BLOOD PRESSURE: 99 MMHG | WEIGHT: 155 LBS | BODY MASS INDEX: 21.7 KG/M2 | HEIGHT: 71 IN | RESPIRATION RATE: 19 BRPM | SYSTOLIC BLOOD PRESSURE: 139 MMHG | HEART RATE: 68 BPM

## 2024-12-23 DIAGNOSIS — R10.9 FLANK PAIN: ICD-10-CM

## 2024-12-23 DIAGNOSIS — R10.33 PERIUMBILICAL ABDOMINAL PAIN: Primary | ICD-10-CM

## 2024-12-23 LAB
ALBUMIN SERPL-MCNC: 4.1 G/DL (ref 3.5–5.2)
ALBUMIN/GLOB SERPL: 1.4 G/DL
ALP SERPL-CCNC: 67 U/L (ref 39–117)
ALT SERPL W P-5'-P-CCNC: 11 U/L (ref 1–41)
ANION GAP SERPL CALCULATED.3IONS-SCNC: 11.9 MMOL/L (ref 5–15)
AST SERPL-CCNC: 22 U/L (ref 1–40)
BACTERIA UR QL AUTO: NORMAL /HPF
BASOPHILS # BLD AUTO: 0.01 10*3/MM3 (ref 0–0.2)
BASOPHILS NFR BLD AUTO: 0.2 % (ref 0–1.5)
BILIRUB SERPL-MCNC: 0.5 MG/DL (ref 0–1.2)
BILIRUB UR QL STRIP: NEGATIVE
BUN SERPL-MCNC: 16 MG/DL (ref 6–20)
BUN/CREAT SERPL: 15 (ref 7–25)
CALCIUM SPEC-SCNC: 8.5 MG/DL (ref 8.6–10.5)
CHLORIDE SERPL-SCNC: 102 MMOL/L (ref 98–107)
CLARITY UR: CLEAR
CO2 SERPL-SCNC: 27.1 MMOL/L (ref 22–29)
COLOR UR: YELLOW
CREAT SERPL-MCNC: 1.07 MG/DL (ref 0.76–1.27)
DEPRECATED RDW RBC AUTO: 43.4 FL (ref 37–54)
EGFRCR SERPLBLD CKD-EPI 2021: 88.9 ML/MIN/1.73
EOSINOPHIL # BLD AUTO: 0.23 10*3/MM3 (ref 0–0.4)
EOSINOPHIL NFR BLD AUTO: 4.5 % (ref 0.3–6.2)
ERYTHROCYTE [DISTWIDTH] IN BLOOD BY AUTOMATED COUNT: 12.6 % (ref 12.3–15.4)
GLOBULIN UR ELPH-MCNC: 3 GM/DL
GLUCOSE SERPL-MCNC: 173 MG/DL (ref 65–99)
GLUCOSE UR STRIP-MCNC: NEGATIVE MG/DL
HCT VFR BLD AUTO: 47 % (ref 37.5–51)
HGB BLD-MCNC: 16.1 G/DL (ref 13–17.7)
HGB UR QL STRIP.AUTO: ABNORMAL
HYALINE CASTS UR QL AUTO: NORMAL /LPF
IMM GRANULOCYTES # BLD AUTO: 0 10*3/MM3 (ref 0–0.05)
IMM GRANULOCYTES NFR BLD AUTO: 0 % (ref 0–0.5)
KETONES UR QL STRIP: NEGATIVE
LEUKOCYTE ESTERASE UR QL STRIP.AUTO: NEGATIVE
LIPASE SERPL-CCNC: 25 U/L (ref 13–60)
LYMPHOCYTES # BLD AUTO: 2 10*3/MM3 (ref 0.7–3.1)
LYMPHOCYTES NFR BLD AUTO: 39.2 % (ref 19.6–45.3)
MCH RBC QN AUTO: 31.6 PG (ref 26.6–33)
MCHC RBC AUTO-ENTMCNC: 34.3 G/DL (ref 31.5–35.7)
MCV RBC AUTO: 92.3 FL (ref 79–97)
MONOCYTES # BLD AUTO: 0.51 10*3/MM3 (ref 0.1–0.9)
MONOCYTES NFR BLD AUTO: 10 % (ref 5–12)
NEUTROPHILS NFR BLD AUTO: 2.35 10*3/MM3 (ref 1.7–7)
NEUTROPHILS NFR BLD AUTO: 46.1 % (ref 42.7–76)
NITRITE UR QL STRIP: NEGATIVE
PH UR STRIP.AUTO: 6.5 [PH] (ref 5–8)
PLATELET # BLD AUTO: 333 10*3/MM3 (ref 140–450)
PMV BLD AUTO: 9 FL (ref 6–12)
POTASSIUM SERPL-SCNC: 3.8 MMOL/L (ref 3.5–5.2)
PROT SERPL-MCNC: 7.1 G/DL (ref 6–8.5)
PROT UR QL STRIP: NEGATIVE
RBC # BLD AUTO: 5.09 10*6/MM3 (ref 4.14–5.8)
RBC # UR STRIP: NORMAL /HPF
REF LAB TEST METHOD: NORMAL
SODIUM SERPL-SCNC: 141 MMOL/L (ref 136–145)
SP GR UR STRIP: 1.01 (ref 1–1.03)
SQUAMOUS #/AREA URNS HPF: NORMAL /HPF
UROBILINOGEN UR QL STRIP: ABNORMAL
WBC # UR STRIP: NORMAL /HPF
WBC NRBC COR # BLD AUTO: 5.1 10*3/MM3 (ref 3.4–10.8)

## 2024-12-23 PROCEDURE — 85025 COMPLETE CBC W/AUTO DIFF WBC: CPT | Performed by: STUDENT IN AN ORGANIZED HEALTH CARE EDUCATION/TRAINING PROGRAM

## 2024-12-23 PROCEDURE — 80053 COMPREHEN METABOLIC PANEL: CPT | Performed by: STUDENT IN AN ORGANIZED HEALTH CARE EDUCATION/TRAINING PROGRAM

## 2024-12-23 PROCEDURE — 36415 COLL VENOUS BLD VENIPUNCTURE: CPT

## 2024-12-23 PROCEDURE — 25010000002 KETOROLAC TROMETHAMINE PER 15 MG: Performed by: STUDENT IN AN ORGANIZED HEALTH CARE EDUCATION/TRAINING PROGRAM

## 2024-12-23 PROCEDURE — 81001 URINALYSIS AUTO W/SCOPE: CPT | Performed by: STUDENT IN AN ORGANIZED HEALTH CARE EDUCATION/TRAINING PROGRAM

## 2024-12-23 PROCEDURE — 25810000003 SODIUM CHLORIDE 0.9 % SOLUTION: Performed by: STUDENT IN AN ORGANIZED HEALTH CARE EDUCATION/TRAINING PROGRAM

## 2024-12-23 PROCEDURE — 96374 THER/PROPH/DIAG INJ IV PUSH: CPT

## 2024-12-23 PROCEDURE — 83690 ASSAY OF LIPASE: CPT | Performed by: STUDENT IN AN ORGANIZED HEALTH CARE EDUCATION/TRAINING PROGRAM

## 2024-12-23 PROCEDURE — 99285 EMERGENCY DEPT VISIT HI MDM: CPT

## 2024-12-23 PROCEDURE — 25510000001 IOPAMIDOL 61 % SOLUTION: Performed by: STUDENT IN AN ORGANIZED HEALTH CARE EDUCATION/TRAINING PROGRAM

## 2024-12-23 PROCEDURE — 74177 CT ABD & PELVIS W/CONTRAST: CPT

## 2024-12-23 RX ORDER — IOPAMIDOL 612 MG/ML
100 INJECTION, SOLUTION INTRAVASCULAR
Status: COMPLETED | OUTPATIENT
Start: 2024-12-23 | End: 2024-12-23

## 2024-12-23 RX ORDER — KETOROLAC TROMETHAMINE 15 MG/ML
15 INJECTION, SOLUTION INTRAMUSCULAR; INTRAVENOUS ONCE
Status: COMPLETED | OUTPATIENT
Start: 2024-12-23 | End: 2024-12-23

## 2024-12-23 RX ADMIN — KETOROLAC TROMETHAMINE 15 MG: 15 INJECTION, SOLUTION INTRAMUSCULAR; INTRAVENOUS at 20:37

## 2024-12-23 RX ADMIN — IOPAMIDOL 100 ML: 612 INJECTION, SOLUTION INTRAVENOUS at 20:43

## 2024-12-23 RX ADMIN — SODIUM CHLORIDE 1000 ML: 9 INJECTION, SOLUTION INTRAVENOUS at 20:36

## 2024-12-24 NOTE — [facility] PROVIDER NOTE
"Subjective  History of Present Illness:    Patient is a 44-year-old male with history of hypertension that presents to the emergency department with generalized abdominal pain and bilateral flank pain.  He states that he had some symptoms last week but had gotten better.  However, over the last few days, he has felt soreness in his bilateral flank area.  States that he has had some diarrhea and felt like he has had decreased urine output.  Denies dysuria or testicle pain.  States he has had prior gastric ulcers that he states \"put a hole in his intestines\".  He does take medication.  Denies nausea, vomiting, chest pain, shortness of breath. Denies saddle anesthesia, bowel or bladder incontinence.      Nurses Notes reviewed and agree, including vitals, allergies, social history and prior medical history.     REVIEW OF SYSTEMS: All systems reviewed and not pertinent unless noted.  Review of Systems    Past Medical History:   Diagnosis Date    Hypertension     Stroke        Allergies:    Patient has no known allergies.      History reviewed. No pertinent surgical history.      Social History     Socioeconomic History    Marital status: Single   Tobacco Use    Smoking status: Some Days     Types: Cigarettes   Vaping Use    Vaping status: Some Days   Substance and Sexual Activity    Alcohol use: Yes     Comment: Socially    Drug use: Yes     Types: Marijuana    Sexual activity: Defer         History reviewed. No pertinent family history.    Objective  Physical Exam:  /99   Pulse 68   Temp 98.2 °F (36.8 °C) (Oral)   Resp 19   Ht 180.3 cm (71\")   Wt 70.3 kg (155 lb)   SpO2 99%   BMI 21.62 kg/m²      Physical Exam  Cardiovascular:      Rate and Rhythm: Normal rate and regular rhythm.   Pulmonary:      Effort: Pulmonary effort is normal.      Breath sounds: Normal breath sounds.   Abdominal:      General: Abdomen is flat.      Palpations: Abdomen is soft.      Tenderness: There is no abdominal tenderness. There " is no right CVA tenderness or left CVA tenderness.   Neurological:      General: No focal deficit present.      Mental Status: He is alert.         Procedures    ED Course:         Lab Results (last 24 hours)       Procedure Component Value Units Date/Time    CBC & Differential [[id number]]  (Normal) Collected: 12/23/24 2030    Specimen: Blood Updated: 12/23/24 2033    Narrative:      The following orders were created for panel order CBC & Differential.  Procedure                               Abnormality         Status                     ---------                               -----------         ------                     CBC Auto Differential[[id number]]        Normal              Final result                 Please view results for these tests on the individual orders.    Comprehensive Metabolic Panel [[id number]]  (Abnormal) Collected: 12/23/24 2030    Specimen: Blood Updated: 12/23/24 2050     Glucose 173 mg/dL      BUN 16 mg/dL      Creatinine 1.07 mg/dL      Sodium 141 mmol/L      Potassium 3.8 mmol/L      Chloride 102 mmol/L      CO2 27.1 mmol/L      Calcium 8.5 mg/dL      Total Protein 7.1 g/dL      Albumin 4.1 g/dL      ALT (SGPT) 11 U/L      AST (SGOT) 22 U/L      Alkaline Phosphatase 67 U/L      Total Bilirubin 0.5 mg/dL      Globulin 3.0 gm/dL      A/G Ratio 1.4 g/dL      BUN/Creatinine Ratio 15.0     Anion Gap 11.9 mmol/L      eGFR 88.9 mL/min/1.73     Narrative:      GFR Categories in Chronic Kidney Disease (CKD)      GFR Category          GFR (mL/min/1.73)    Interpretation  G1                     90 or greater         Normal or high (1)  G2                      60-89                Mild decrease (1)  G3a                   45-59                Mild to moderate decrease  G3b                   30-44                Moderate to severe decrease  G4                    15-29                Severe decrease  G5                    14 or less           Kidney failure          (1)In the absence of evidence of  kidney disease, neither GFR category G1 or G2 fulfill the criteria for CKD.    eGFR calculation 2021 CKD-EPI creatinine equation, which does not include race as a factor    Lipase [[id number]]  (Normal) Collected: 12/23/24 2030    Specimen: Blood Updated: 12/23/24 2050     Lipase 25 U/L     CBC Auto Differential [[id number]]  (Normal) Collected: 12/23/24 2030    Specimen: Blood Updated: 12/23/24 2033     WBC 5.10 10*3/mm3      RBC 5.09 10*6/mm3      Hemoglobin 16.1 g/dL      Hematocrit 47.0 %      MCV 92.3 fL      MCH 31.6 pg      MCHC 34.3 g/dL      RDW 12.6 %      RDW-SD 43.4 fl      MPV 9.0 fL      Platelets 333 10*3/mm3      Neutrophil % 46.1 %      Lymphocyte % 39.2 %      Monocyte % 10.0 %      Eosinophil % 4.5 %      Basophil % 0.2 %      Immature Grans % 0.0 %      Neutrophils, Absolute 2.35 10*3/mm3      Lymphocytes, Absolute 2.00 10*3/mm3      Monocytes, Absolute 0.51 10*3/mm3      Eosinophils, Absolute 0.23 10*3/mm3      Basophils, Absolute 0.01 10*3/mm3      Immature Grans, Absolute 0.00 10*3/mm3     Urinalysis With Microscopic If Indicated (No Culture) - Urine, Clean Catch [[id number]]  (Abnormal) Collected: 12/23/24 2317    Specimen: Urine, Clean Catch Updated: 12/23/24 2324     Color, UA Yellow     Appearance, UA Clear     pH, UA 6.5     Specific Gravity, UA 1.010     Glucose, UA Negative     Ketones, UA Negative     Bilirubin, UA Negative     Blood, UA Trace     Protein, UA Negative     Leuk Esterase, UA Negative     Nitrite, UA Negative     Urobilinogen, UA 0.2 E.U./dL    Urinalysis, Microscopic Only - Urine, Clean Catch [[id number]] Collected: 12/23/24 2317    Specimen: Urine, Clean Catch Updated: 12/23/24 2326     RBC, UA 0-2 /HPF      WBC, UA 0-2 /HPF      Bacteria, UA None Seen /HPF      Squamous Epithelial Cells, UA None Seen /HPF      Hyaline Casts, UA None Seen /LPF      Methodology Manual Light Microscopy             CT Abdomen Pelvis With Contrast    Result Date: 12/23/2024  CT ABDOMEN PELVIS W  CONTRAST Date of Exam: 12/23/2024 8:38 PM EST Indication: periumbilical pain, bilateral flank pain. Comparison: None available. Technique: Axial CT images were obtained of the abdomen and pelvis following the uneventful intravenous administration of 100 cc Isovue-300 IV contrast . Reconstructed coronal and sagittal images were also obtained. Automated exposure control and iterative construction methods were used. FINDINGS: Lung bases: No masses. No consolidation. Liver:No masses. No intrahepatic biliary ductal dilatation. Spleen:No masses. No perisplenic hematoma. Pancreas:No pancreatic masses. No evidence of pancreatitis. Gallbladder and common bile duct:No evidence of cholelithiasis. No evidence of cholecystitis. Adrenal glands:No adrenal masses Kidneys and ureters:No kidney stones. No renal masses.No calculi present within the ureters. Normal caliber ureters. Urinary bladder:No urinary bladder wall thickening. No bladder masses. Small bowel:Normal caliber small bowel. Large bowel:No diverticulosis or diverticulitis. No large bowel masses are appreciated Appendix: Not seen however there is no evidence of appendicitis GENITOURINARY: The prostate is normal Ascites or pneumoperitoneum:None. Adenopathy:None present Osseous structures: The proximal femurs are intact. The pubic bones are intact. The sacrum and sacroiliac joints are normal. Lumbar vertebral body height and alignment are normal. No rib fractures Other findings: None     Impression: No acute abdominal or pelvic pathology. Electronically Signed: Robert Spring MD  12/23/2024 8:58 PM EST  Workstation ID: [id number]        [facility]  Number of Diagnoses or Management Options  Flank pain  Periumbilical abdominal pain  Diagnosis management comments: 44-year-old male presenting with periumbilical abdominal pain and bilateral flank pain.  On initial presentation, patient is overall very well-appearing and in no distress.  On exam, he does not have significant abdominal  tenderness.  He does not have CVA tenderness and there is no midline spinal tenderness.  Will give bolus of fluids and dose of pain medication.  Patient notes using marijuana but denies any cocaine use.  Lab work was overall unremarkable.  CT did not show acute abnormalities.  Very well and comfortable appearing.  He states that his symptoms have improved some.  Etiology is currently unclear, however, I do have lower suspicion for bowel obstruction, bowel perforation, appendicitis, septic renal stone, mesenteric ischemia, or other emergent causes for this presentation.  Also have lower suspicion for spinal epidural abscess or hematoma or cauda equina syndrome given that he does not have saddle anesthesia, bowel or bladder incontinence, midline spinal tenderness.  I do believe patient would be safe for discharge, ever, counseled on return precautions for new or worsening symptoms.          My independent interpretation of abdominal CT: No large AAA      Consideration of Admission/Observation: Considered admission of patient, however, after generally unremarkable workup with improvement in symptoms, ultimate decision was made for discharge with outpatient follow-up.      Medications   sodium chloride 0.9 % bolus 1,000 mL (0 mL Intravenous Stopped 12/23/24 2152)   ketorolac (TORADOL) injection 15 mg (15 mg Intravenous Given 12/23/24 2037)   iopamidol (ISOVUE-300) 61 % injection 100 mL (100 mL Intravenous Given 12/23/24 2043)       -----  ED Disposition       ED Disposition   Discharge    Condition   Stable    Comment   --             Final diagnoses:   Periumbilical abdominal pain   Flank pain      Your Follow-Up Providers       Schedule an appointment as soon as possible for a visit  with [facility] [place].    Follow up details: As needed, If symptoms worsen  [place] [id number] [phone]                     Contact information for after-discharge care    Follow-up information has not been  specified.                    Your medication list        CONTINUE taking these medications        Instructions Last Dose Given Next Dose Due   amLODIPine 10 MG tablet  Commonly known as: NORVASC      Take 1 tablet by mouth Daily.